# Patient Record
Sex: MALE | Race: WHITE | Employment: STUDENT | URBAN - METROPOLITAN AREA
[De-identification: names, ages, dates, MRNs, and addresses within clinical notes are randomized per-mention and may not be internally consistent; named-entity substitution may affect disease eponyms.]

---
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
## 2022-11-09 ENCOUNTER — OFFICE VISIT (OUTPATIENT)
Dept: FAMILY MEDICINE CLINIC | Age: 22
End: 2022-11-09
Payer: MEDICAID

## 2022-11-09 VITALS
TEMPERATURE: 97.9 F | SYSTOLIC BLOOD PRESSURE: 122 MMHG | BODY MASS INDEX: 26.48 KG/M2 | DIASTOLIC BLOOD PRESSURE: 76 MMHG | HEART RATE: 77 BPM | OXYGEN SATURATION: 96 % | HEIGHT: 70 IN | WEIGHT: 185 LBS

## 2022-11-09 DIAGNOSIS — H66.002 NON-RECURRENT ACUTE SUPPURATIVE OTITIS MEDIA OF LEFT EAR WITHOUT SPONTANEOUS RUPTURE OF TYMPANIC MEMBRANE: Primary | ICD-10-CM

## 2022-11-09 DIAGNOSIS — R09.81 NASAL CONGESTION: ICD-10-CM

## 2022-11-09 PROCEDURE — 99203 OFFICE O/P NEW LOW 30 MIN: CPT | Performed by: NURSE PRACTITIONER

## 2022-11-09 RX ORDER — FLUTICASONE PROPIONATE 50 MCG
1 SPRAY, SUSPENSION (ML) NASAL DAILY
Qty: 1 EACH | Refills: 0 | Status: SHIPPED | OUTPATIENT
Start: 2022-11-09

## 2022-11-09 RX ORDER — CEFDINIR 300 MG/1
300 CAPSULE ORAL 2 TIMES DAILY
Qty: 20 CAPSULE | Refills: 0 | Status: SHIPPED | OUTPATIENT
Start: 2022-11-09 | End: 2022-11-19

## 2022-11-09 RX ORDER — CETIRIZINE HYDROCHLORIDE 10 MG/1
10 TABLET ORAL DAILY
Qty: 14 TABLET | Refills: 0 | Status: SHIPPED | OUTPATIENT
Start: 2022-11-09

## 2022-11-09 RX ORDER — LEVETIRACETAM 1000 MG/1
TABLET ORAL
COMMUNITY
Start: 2022-08-31

## 2022-11-09 ASSESSMENT — ENCOUNTER SYMPTOMS
WHEEZING: 0
RHINORRHEA: 1
SORE THROAT: 0
SHORTNESS OF BREATH: 0
COUGH: 1

## 2022-11-09 NOTE — LETTER
[facility]  [address]  Phone: [phone]  Fax: [phone]    ROSARIO Mcghee - JEANETH        November 9, 2022     Patient: Judy Mak   YOB: 2000   Date of Visit: 11/9/2022       To Whom it May Concern:    Judy Mak was seen in my clinic on 11/9/2022. He may return to school on 11/10/2022. He was diagnosed with an upper respiratory infection and left ear infection. If you have any questions or concerns, please don't hesitate to call.     Sincerely,         ROSARIO Mcghee - CNP

## 2022-11-09 NOTE — PROGRESS NOTES
[address] [address]  Dept: [phone]  Dept Fax: [phone]    Monae Aguirre is a 23 y.o. male who presents to the urgent care today for his medical conditions/complaints as notedbelow. Monae Aguirre is c/o of Cough and Nasal Congestion (This has been going on since Sunday. )      HPI:     23 yr old male presents for cough and congestion for 3 days. Left ear pain. Negative home covid test, declines additional covid testing  Mom ill with similar uri sx and she also tested neg for covid at [facility]  autistic    Cough  This is a new problem. The current episode started in the past 7 days (x3d). The problem has been unchanged. The cough is Non-productive. Associated symptoms include a fever (?102)), headaches, myalgias, nasal congestion, postnasal drip and rhinorrhea. Pertinent negatives include no chest pain, chills, ear pain, rash, sore throat, shortness of breath or wheezing. Nothing aggravates the symptoms. He has tried OTC cough suppressant for the symptoms. The treatment provided no relief. There is no history of asthma, bronchitis, environmental allergies or pneumonia.      Past Medical History:   Diagnosis Date    Autism     Seizures ([address]     mom states this is his 4th seizure this year        Current Outpatient Medications   Medication Sig Dispense Refill    levETIRAcetam (KEPPRA) 1000 MG tablet take 1 tablet by mouth twice a day      sertraline (ZOLOFT) 50 MG tablet take 1 tablet by mouth once daily      cefdinir (OMNICEF) 300 MG capsule Take 1 capsule by mouth 2 times daily for 10 days 20 capsule 0    fluticasone (FLONASE ALLERGY RELIEF) 50 MCG/ACT nasal spray 1 spray by Nasal route daily 1 each 0    cetirizine (ZYRTEC ALLERGY) 10 MG tablet Take 1 tablet by mouth daily 14 tablet 0    OXcarbazepine (TRILEPTAL) 300 MG tablet Take 3 tablets by mouth 2 times daily 180 tablet 0 naproxen (NAPROSYN) 375 MG tablet Take 1 tablet by mouth 2 times daily (with meals) (Patient not taking: Reported on 11/9/2022) 20 tablet 0     No current facility-administered medications for this visit. No Known Allergies    Subjective:      Review of Systems   Constitutional:  Positive for fever (?102)). Negative for chills. HENT:  Positive for postnasal drip and rhinorrhea. Negative for ear pain and sore throat. Respiratory:  Positive for cough. Negative for shortness of breath and wheezing. Cardiovascular:  Negative for chest pain. Musculoskeletal:  Positive for myalgias. Skin:  Negative for rash. Allergic/Immunologic: Negative for environmental allergies. Neurological:  Positive for headaches. All other systems reviewed and are negative. 14 systems reviewed and negative except as listed in HPI. Objective:     Physical Exam  Vitals and nursing note reviewed. Constitutional:       General: He is not in acute distress. Appearance: Normal appearance. He is well-developed. He is not ill-appearing, toxic-appearing or diaphoretic. Comments: nontoxic   HENT:      Head: Normocephalic and atraumatic. Right Ear: Tympanic membrane, ear canal and external ear normal.      Left Ear: Ear canal and external ear normal.      Ears:      Comments: Left tm bulging and injected     Nose: Rhinorrhea present. Mouth/Throat:      Mouth: Mucous membranes are moist.      Pharynx: Oropharynx is clear. No oropharyngeal exudate or posterior oropharyngeal erythema. Eyes:      General: No scleral icterus. Right eye: No discharge. Left eye: No discharge. Conjunctiva/sclera: Conjunctivae normal.      Pupils: Pupils are equal, round, and reactive to light. Cardiovascular:      Rate and Rhythm: Normal rate and regular rhythm. Pulses: Normal pulses. Heart sounds: Normal heart sounds. Pulmonary:      Effort: Pulmonary effort is normal. No respiratory distress. Breath sounds: Normal breath sounds. No stridor. No wheezing, rhonchi or rales. Chest:      Chest wall: No tenderness. Abdominal:      General: Bowel sounds are normal. There is no distension. Palpations: Abdomen is soft. Tenderness: There is no abdominal tenderness. Musculoskeletal:         General: No tenderness or deformity. Normal range of motion. Cervical back: Normal range of motion and neck supple. Lymphadenopathy:      Cervical: No cervical adenopathy. Skin:     General: Skin is warm and dry. Findings: No rash ( no rash to visible skin). Neurological:      General: No focal deficit present. Mental Status: He is alert and oriented to person, place, and time. Motor: No abnormal muscle tone. Coordination: Coordination normal.   Psychiatric:         Mood and Affect: Mood normal.         Behavior: Behavior normal.     /76 (Site: Left Upper Arm, Position: Supine, Cuff Size: Medium Adult)   Pulse 77   Temp 97.9 °F (36.6 °C) (Temporal)   Ht 5' 10\" (1.778 m)   Wt 185 lb (83.9 kg)   SpO2 96%   BMI 26.54 kg/m²     Assessment:       Diagnosis Orders   1. Non-recurrent acute suppurative otitis media of left ear without spontaneous rupture of tympanic membrane        2. Nasal congestion            Plan:    Vss, ls clear t/o prone to om, left om on exam  Refilled claritin and flonase  Cefdinir rx  Mom declines additional coivd testing  I reviewed mom's chart from ER visit - neg covid and flu    Reviewed over-the-counter treatments for symptom management. Return if symptoms worsen or fail to improve, for make appt with Family Doc in 2 weeks for ear check.     Orders Placed This Encounter   Medications    cefdinir (OMNICEF) 300 MG capsule     Sig: Take 1 capsule by mouth 2 times daily for 10 days     Dispense:  20 capsule     Refill:  0    fluticasone (FLONASE ALLERGY RELIEF) 50 MCG/ACT nasal spray     Si spray by Nasal route daily     Dispense:  1 each Refill:  0    cetirizine (ZYRTEC ALLERGY) 10 MG tablet     Sig: Take 1 tablet by mouth daily     Dispense:  14 tablet     Refill:  0           Patient given educational materials - see patient instructions. Discussed use, benefit, and side effects of prescribed medications. All patient questions answered. Pt voicedunderstanding.     Electronically signed by ROSARIO Benton CNP on 11/9/2022 at 3:17 PM

## 2023-01-06 ENCOUNTER — HOSPITAL ENCOUNTER (EMERGENCY)
Age: 23
Discharge: HOME | End: 2023-01-06
Attending: EMERGENCY MEDICINE
Payer: MEDICAID

## 2023-01-06 ENCOUNTER — APPOINTMENT (OUTPATIENT)
Dept: GENERAL RADIOLOGY | Age: 23
End: 2023-01-06
Payer: MEDICAID

## 2023-01-06 ENCOUNTER — APPOINTMENT (OUTPATIENT)
Dept: CT IMAGING | Age: 23
End: 2023-01-06
Payer: MEDICAID

## 2023-01-06 VITALS
RESPIRATION RATE: 16 BRPM | HEART RATE: 90 BPM | WEIGHT: 200 LBS | SYSTOLIC BLOOD PRESSURE: 116 MMHG | DIASTOLIC BLOOD PRESSURE: 67 MMHG | HEIGHT: 70 IN | OXYGEN SATURATION: 95 % | TEMPERATURE: 99.7 F | BODY MASS INDEX: 28.63 KG/M2

## 2023-01-06 DIAGNOSIS — R56.9 SEIZURE (HCC): Primary | ICD-10-CM

## 2023-01-06 LAB
ABSOLUTE EOS #: 0.09 K/UL (ref 0–0.44)
ABSOLUTE IMMATURE GRANULOCYTE: 0.07 K/UL (ref 0–0.3)
ABSOLUTE LYMPH #: 1.59 K/UL (ref 1.2–5.2)
ABSOLUTE MONO #: 0.8 K/UL (ref 0.1–1.4)
ANION GAP SERPL CALCULATED.3IONS-SCNC: 7 MMOL/L (ref 9–17)
BASOPHILS # BLD: 0 % (ref 0–2)
BASOPHILS ABSOLUTE: 0.05 K/UL (ref 0–0.2)
BUN BLDV-MCNC: 8 MG/DL (ref 6–20)
CALCIUM SERPL-MCNC: 9.3 MG/DL (ref 8.6–10.4)
CHLORIDE BLD-SCNC: 101 MMOL/L (ref 98–107)
CO2: 29 MMOL/L (ref 20–31)
CREAT SERPL-MCNC: 0.78 MG/DL (ref 0.7–1.2)
EOSINOPHILS RELATIVE PERCENT: 1 % (ref 1–4)
GFR SERPL CREATININE-BSD FRML MDRD: >60 ML/MIN/1.73M2
GLUCOSE BLD-MCNC: 138 MG/DL (ref 70–99)
HCT VFR BLD CALC: 43.6 % (ref 40.7–50.3)
HEMOGLOBIN: 14.8 G/DL (ref 13–17)
IMMATURE GRANULOCYTES: 1 %
KEPPRA: <2 UG/ML
LYMPHOCYTES # BLD: 13 % (ref 25–45)
MCH RBC QN AUTO: 30.9 PG (ref 25.2–33.5)
MCHC RBC AUTO-ENTMCNC: 33.9 G/DL (ref 28.4–34.8)
MCV RBC AUTO: 91 FL (ref 82.6–102.9)
MONOCYTES # BLD: 7 % (ref 2–8)
NRBC AUTOMATED: 0 PER 100 WBC
PDW BLD-RTO: 12.8 % (ref 11.8–14.4)
PLATELET # BLD: 370 K/UL (ref 138–453)
PMV BLD AUTO: 9.8 FL (ref 8.1–13.5)
POTASSIUM SERPL-SCNC: 3.7 MMOL/L (ref 3.7–5.3)
RBC # BLD: 4.79 M/UL (ref 4.21–5.77)
SEG NEUTROPHILS: 78 % (ref 34–64)
SEGMENTED NEUTROPHILS ABSOLUTE COUNT: 9.52 K/UL (ref 1.8–8)
SODIUM BLD-SCNC: 137 MMOL/L (ref 135–144)
WBC # BLD: 12.1 K/UL (ref 4.5–13.5)

## 2023-01-06 PROCEDURE — 96365 THER/PROPH/DIAG IV INF INIT: CPT

## 2023-01-06 PROCEDURE — 80177 DRUG SCRN QUAN LEVETIRACETAM: CPT

## 2023-01-06 PROCEDURE — 93005 ELECTROCARDIOGRAM TRACING: CPT | Performed by: STUDENT IN AN ORGANIZED HEALTH CARE EDUCATION/TRAINING PROGRAM

## 2023-01-06 PROCEDURE — 12013 RPR F/E/E/N/L/M 2.6-5.0 CM: CPT

## 2023-01-06 PROCEDURE — 6370000000 HC RX 637 (ALT 250 FOR IP): Performed by: STUDENT IN AN ORGANIZED HEALTH CARE EDUCATION/TRAINING PROGRAM

## 2023-01-06 PROCEDURE — 85025 COMPLETE CBC W/AUTO DIFF WBC: CPT

## 2023-01-06 PROCEDURE — 80183 DRUG SCRN QUANT OXCARBAZEPIN: CPT

## 2023-01-06 PROCEDURE — 71045 X-RAY EXAM CHEST 1 VIEW: CPT

## 2023-01-06 PROCEDURE — 96375 TX/PRO/DX INJ NEW DRUG ADDON: CPT

## 2023-01-06 PROCEDURE — 2500000003 HC RX 250 WO HCPCS: Performed by: STUDENT IN AN ORGANIZED HEALTH CARE EDUCATION/TRAINING PROGRAM

## 2023-01-06 PROCEDURE — 80048 BASIC METABOLIC PNL TOTAL CA: CPT

## 2023-01-06 PROCEDURE — 70450 CT HEAD/BRAIN W/O DYE: CPT

## 2023-01-06 PROCEDURE — 99285 EMERGENCY DEPT VISIT HI MDM: CPT

## 2023-01-06 PROCEDURE — 6360000002 HC RX W HCPCS: Performed by: STUDENT IN AN ORGANIZED HEALTH CARE EDUCATION/TRAINING PROGRAM

## 2023-01-06 RX ORDER — ACETAMINOPHEN 500 MG
1000 TABLET ORAL ONCE
Status: COMPLETED | OUTPATIENT
Start: 2023-01-06 | End: 2023-01-06

## 2023-01-06 RX ORDER — LIDOCAINE HYDROCHLORIDE 10 MG/ML
20 INJECTION, SOLUTION INFILTRATION; PERINEURAL ONCE
Status: COMPLETED | OUTPATIENT
Start: 2023-01-06 | End: 2023-01-06

## 2023-01-06 RX ORDER — LEVETIRACETAM 15 MG/ML
1500 INJECTION INTRAVASCULAR ONCE
Status: COMPLETED | OUTPATIENT
Start: 2023-01-06 | End: 2023-01-06

## 2023-01-06 RX ORDER — MIDAZOLAM HYDROCHLORIDE 2 MG/2ML
2 INJECTION, SOLUTION INTRAMUSCULAR; INTRAVENOUS ONCE
Status: COMPLETED | OUTPATIENT
Start: 2023-01-06 | End: 2023-01-06

## 2023-01-06 RX ADMIN — ACETAMINOPHEN 1000 MG: 500 TABLET ORAL at 17:19

## 2023-01-06 RX ADMIN — LIDOCAINE HYDROCHLORIDE 3 ML: 10 INJECTION, SOLUTION INFILTRATION; PERINEURAL at 19:11

## 2023-01-06 RX ADMIN — LEVETIRACETAM 1500 MG: 15 INJECTION INTRAVENOUS at 18:40

## 2023-01-06 RX ADMIN — MIDAZOLAM 2 MG: 1 INJECTION INTRAMUSCULAR; INTRAVENOUS at 18:34

## 2023-01-06 ASSESSMENT — PAIN DESCRIPTION - LOCATION: LOCATION: HEAD

## 2023-01-06 ASSESSMENT — PAIN SCALES - GENERAL: PAINLEVEL_OUTOF10: 4

## 2023-01-06 ASSESSMENT — PAIN - FUNCTIONAL ASSESSMENT
PAIN_FUNCTIONAL_ASSESSMENT: 0-10
PAIN_FUNCTIONAL_ASSESSMENT: NONE - DENIES PAIN

## 2023-01-07 NOTE — ED PROVIDER NOTES
[address] ED  Emergency Department  Emergency Medicine Resident Sign-out     Care of Alejandro Gustafson was assumed from Dr. Srini Sal and is being seen for Seizures ([phone] lasted approx 1 min. ) and Laceration  . The patient's initial evaluation and plan have been discussed with the prior provider who initially evaluated the patient. EMERGENCY DEPARTMENT COURSE / MEDICAL DECISION MAKING:       MEDICATIONS GIVEN:  Orders Placed This Encounter   Medications    acetaminophen (TYLENOL) tablet 1,000 mg    levETIRAcetam (KEPPRA) 1500 mg/100 mL IVPB    lidocaine 1 % injection 20 mL    midazolam PF (VERSED) injection 2 mg       LABS / RADIOLOGY:     Labs Reviewed   CBC WITH AUTO DIFFERENTIAL - Abnormal; Notable for the following components:       Result Value    Seg Neutrophils 78 (*)     Lymphocytes 13 (*)     Immature Granulocytes 1 (*)     Segs Absolute 9.52 (*)     All other components within normal limits   BASIC METABOLIC PANEL - Abnormal; Notable for the following components:    Glucose 138 (*)     Anion Gap 7 (*)     All other components within normal limits   LEVETIRACETAM LEVEL   OXCARBAZEPINE LEVEL       XR CHEST PORTABLE    Result Date: 1/6/2023  EXAMINATION: ONE XRAY VIEW OF THE CHEST 1/6/2023 5:28 pm COMPARISON: 11/14/2016 HISTORY: Seizure. FINDINGS: Cardiomediastinal silhouette and pulmonary vasculature are normal.  No consolidation, pleural effusion, or pneumothorax. No acute abnormality. RECENT VITALS:     Temp: 99.7 °F (37.6 °C),  Heart Rate: (!) 114, Resp: 16, BP: 125/74, SpO2: 97 %    This patient is a 23 y.o. Male with breakthrough seizure and labs and imaging per previous resident reviewed. Plan for discharge pending negative CT head. ED Course as of 01/06/23 2315   Maura Downing Jan 06, 2023   [age]-year-old male, history of autism, MRDD had a seizure today. Followed by neurology at [address] [place] [address].   Apparently had some medication changes, increasing dose of Keppra, he is also on Trileptal.  Seizure was not witnessed however upon arrival of the mother patient appeared to be postictal.  Patient is not on any blood thinners. On physical exam patient has a laceration approximately 3 cm in length right above the left eyebrow. Patient is able to follow commands, no signs of trauma to head. No bruising/ecchymoses on the abdomen or back. No CT or L-spine midline tenderness. Lower extremities are nontender, no swelling. Will order basic labs, need to evaluate for breakthrough seizures despite therapeutic treatment. Unclear severity of head injury, neurologic exam obscured by patient's mental status. Although the mother states that this is baseline, will need to rule out intracranial hemorrhage. CT without contrast ordered. [SN]   4332 Chest x-ray reviewed by myself does not show any acute focal abnormalities. [KK]   1754 Levetiracetam: <2 [KK]   1754 Levetiracetam: <2 [OP]   7584 Keppra level came back at less than 2, will load with 1500 mg of Keppra IV. [KK]   1909 Patient was given 2 mg of Versed prior to laceration repair. See procedure note. Laceration repaired with 5-0 Prolene sutures x8. Patient tolerated procedure well. We will apply dressing, plan for discharge. Jose Ly Mother at bedside verbalized understanding that she needs to schedule an appointment with neurology over at [address] [place] [address] at earliest convenience. [KK]   2015 Assumed care, CT pending will follow up. Breakthrough seizures, loaded with keppra for subtherapeutic levels. 1000 mg BID. [MA]   2040 CT HEAD WO CONTRAST  IMPRESSION:  No acute intracranial abnormality. [MA]   2053 Patient to be discharged to follow-up with [address] [place] [address] neurology team.  Mother compliant understanding this plan. Strict return precautions provided. They expressed understanding of strict return precautions back to me.  [MA]      ED Course User Index  [address] Jimi Vásquez,   [MA] Jeb Hernández, DO           OUTSTANDING TASKS / RECOMMENDATIONS:    F/u CT head  Discharge if negative. FINAL IMPRESSION:     1.  Seizure [facility])        DISPOSITION:         DISPOSITION:  [x]  Discharge - home with follow up with neurology    []  Transfer -    []  Admission -     []  Against Medical Advice   []  Eloped   FOLLOW-UP: [facility]  [address] [facility] [address]  [phone]  Go to   As needed    [facility] [address]: Current Discharge Medication List              Praveen Osborne DO  Emergency Medicine Resident  [address] [facility]       Praveen Osborne [place]  Resident  01/06/23 4684

## 2023-01-07 NOTE — ED NOTES
Dr. Nestor Carrizales at [facility] to suture pt's lacerated wound. Writer gave versed 2mg prior to procedure. Pt had 6 sutures.         Lesa Pool RN  01/06/23 3615

## 2023-01-07 NOTE — ED PROVIDER NOTES
[facility] ED  Emergency Department Encounter  EmergencyMedicine Resident     Pt Diane Hayden  MRN: [id number]  [place] 2000  Date of evaluation: 1/6/23  PCP:  Monique Sun    This patient was evaluated in the Emergency Department for symptoms described in the history of present illness. The patient was evaluated in the context of the global COVID-19 pandemic, which necessitated consideration that the patient might be at risk for infection with the SARS-CoV-2 virus that causes COVID-19. Institutional protocols and algorithms that pertain to the evaluation of patients at risk for COVID-19 are in a state of rapid change based on information released by regulatory bodies including the CDC and federal and state organizations. These policies and algorithms were followed during the patient's care in the ED. CHIEF COMPLAINT       Chief Complaint   Patient presents with    Seizures     1535 lasted approx 1 min. Laceration       HISTORY OF PRESENT ILLNESS  (Location/Symptom, Timing/Onset, Context/Setting, Quality, Duration, Modifying Factors, Severity.)      Inocente Wolfe is a 23 y.o. male who presents with seizure. Patient has a history of autism, seizure disorder, takes Keppra daily, is followed by [facility]. Patient at bedside not in any acute distress, history is provided by the mother. The seizure was unwitnessed however the mother describes the aftermath as a postictal state. She states that this appears to be normal for a postictal state for him. Patient has not had breakthrough seizures as of late, however about a month ago he had some medication changes, Keppra was increased, he had a breakthrough seizure about a month ago, another breakthrough seizure today. Patient did lacerate his left upper eyebrow. PAST MEDICAL / SURGICAL / SOCIAL / FAMILY HISTORY      has a past medical history of Autism and Seizures ([facility] [address]        has no past surgical history on file.      Social History     Socioeconomic History    Marital status: Single     Spouse name: Not on file    Number of children: Not on file    Years of education: Not on file    Highest education level: Not on file   Occupational History    Not on file   Tobacco Use    Smoking status: Passive Smoke Exposure - Never Smoker    Smokeless tobacco: Never   Vaping Use    Vaping Use: Never used   Substance and Sexual Activity    Alcohol use: No    Drug use: No    Sexual activity: Not on file   Other Topics Concern    Not on file   Social History Narrative    Not on file     Social Determinants of Health     Financial Resource Strain: Not on file   Food Insecurity: Not on file   Transportation Needs: Not on file   Physical Activity: Not on file   Stress: Not on file   Social Connections: Not on file   Intimate Partner Violence: Not on file   Housing Stability: Not on file       History reviewed. No pertinent family history. Allergies:  Patient has no known allergies. Home Medications:  Prior to Admission medications    Medication Sig Start Date End Date Taking?  Authorizing Provider   levETIRAcetam (KEPPRA) 1000 MG tablet take 1 tablet by mouth twice a day 8/31/22   Historical Provider, MD   sertraline (ZOLOFT) 50 MG tablet take 1 tablet by mouth once daily 8/31/22   Historical Provider, MD   fluticasone [facility] ALLERGY RELIEF) 50 MCG/ACT nasal spray 1 spray by Nasal route daily  Patient not taking: Reported on 1/6/2023 11/9/22   ROSARIO Fernández CNP   cetirizine (ZYRTEC ALLERGY) 10 MG tablet Take 1 tablet by mouth daily  Patient not taking: Reported on 1/6/2023 11/9/22   ROSARIO Fernández CNP   OXcarbazepine (TRILEPTAL) 300 MG tablet Take 3 tablets by mouth 2 times daily 4/17/21   ROSARIO Duffy CNP     INITIAL VITALS:   /74   Pulse (!) 114   Temp 99.7 °F (37.6 °C) (Oral)   Resp 16   Ht 5' 10\" (1.778 m)   Wt 200 lb (90.7 kg)   SpO2 97%   BMI 28.70 kg/m²       DIFFERENTIAL DIAGNOSIS     PLAN (LABS / IMAGING / EKG):  Orders Placed This Encounter   Procedures    XR CHEST PORTABLE    CT HEAD WO CONTRAST    CBC with Auto Differential    Basic Metabolic Panel    OXCARBAZEPINE LEVEL    Levetiracetam Level    EKG 12 Lead       MEDICATIONS ORDERED:  Orders Placed This Encounter   Medications    acetaminophen (TYLENOL) tablet 1,000 mg    levETIRAcetam (KEPPRA) 1500 mg/100 mL IVPB    lidocaine 1 % injection 20 mL    midazolam PF (VERSED) injection 2 mg           DIAGNOSTIC RESULTS / EMERGENCY DEPARTMENT COURSE / MDM   LAB RESULTS:  Results for orders placed or performed during the hospital encounter of 01/06/23   CBC with Auto Differential   Result Value Ref Range    WBC 12.1 4.5 - 13.5 k/uL    RBC 4.79 4.21 - 5.77 m/uL    Hemoglobin 14.8 13.0 - 17.0 g/dL    Hematocrit 43.6 40.7 - 50.3 %    MCV 91.0 82.6 - 102.9 fL    MCH 30.9 25.2 - 33.5 pg    MCHC 33.9 28.4 - 34.8 g/dL    RDW 12.8 11.8 - 14.4 %    Platelets 701 894 - 168 k/uL    MPV 9.8 8.1 - 13.5 fL    NRBC Automated 0.0 0.0 per 100 WBC    Seg Neutrophils 78 (H) 34 - 64 %    Lymphocytes 13 (L) 25 - 45 %    Monocytes 7 2 - 8 %    Eosinophils % 1 1 - 4 %    Basophils 0 0 - 2 %    Immature Granulocytes 1 (H) 0 %    Segs Absolute 9.52 (H) 1.80 - 8.00 k/uL    Absolute Lymph # 1.59 1.20 - 5.20 k/uL    Absolute Mono # 0.80 0.10 - 1.40 k/uL    Absolute Eos # 0.09 0.00 - 0.44 k/uL    Basophils Absolute 0.05 0.00 - 0.20 k/uL    Absolute Immature Granulocyte 0.07 0.00 - 0.30 k/uL   Basic Metabolic Panel   Result Value Ref Range    Glucose 138 (H) 70 - 99 mg/dL    BUN 8 6 - 20 mg/dL    Creatinine 0.78 0.70 - 1.20 mg/dL    Est, Glom Filt Rate >60 >60 mL/min/1.73m2    Calcium 9.3 8.6 - 10.4 mg/dL    Sodium 137 135 - 144 mmol/L    Potassium 3.7 3.7 - 5.3 mmol/L    Chloride 101 98 - 107 mmol/L    CO2 29 20 - 31 mmol/L    Anion Gap 7 (L) 9 - 17 mmol/L   Levetiracetam Level   Result Value Ref Range    Levetiracetam Lvl <2 ug/mL         RADIOLOGY:  CT HEAD WO CONTRAST    Result Date: 1/6/2023  No acute intracranial abnormality. XR CHEST PORTABLE    Result Date: 1/6/2023  No acute abnormality. EKG  No acute ST elevations or depressions, sinus tachycardia normal QT C, normal axis. Tachycardia    EMERGENCY DEPARTMENT COURSE:  ED Course as of 01/07/23 1122   Fri Jan 06, 2023   166 68-year-old male, history of autism, MRDD had a seizure today. Followed by neurology at [address] [place] [address]. Apparently had some medication changes, increasing dose of Keppra, he is also on Trileptal.  Seizure was not witnessed however upon arrival of the mother patient appeared to be postictal.  Patient is not on any blood thinners. On physical exam patient has a laceration approximately 3 cm in length right above the left eyebrow. Patient is able to follow commands, no signs of trauma to head. No bruising/ecchymoses on the abdomen or back. No CT or L-spine midline tenderness. Lower extremities are nontender, no swelling. Will order basic labs, need to evaluate for breakthrough seizures despite therapeutic treatment. Unclear severity of head injury, neurologic exam obscured by patient's mental status. Although the mother states that this is baseline, will need to rule out intracranial hemorrhage. CT without contrast ordered. [ZY]   6521 Chest x-ray reviewed by myself does not show any acute focal abnormalities. [KK]   1754 Levetiracetam: <2 [KK]   1754 Levetiracetam: <2 [HH]   1048 Keppra level came back at less than 2, will load with 1500 mg of Keppra IV. [KK]   1909 Patient was given 2 mg of Versed prior to laceration repair. See procedure note. Laceration repaired with 5-0 Prolene sutures x8. Patient tolerated procedure well. We will apply dressing, plan for discharge. Kathy Left Mother at bedside verbalized understanding that she needs to schedule an appointment with neurology over at [address] [place] [address] at earliest convenience.  [KK]   2015 Assumed care, CT pending will follow up. Breakthrough seizures, loaded with keppra for subtherapeutic levels. 1000 mg BID. [MA]   2040 CT HEAD WO CONTRAST  IMPRESSION:  No acute intracranial abnormality. [MA]   2053 Patient to be discharged to follow-up with [facility] neurology team.  Mother compliant understanding this plan. Strict return precautions provided. They expressed understanding of strict return precautions back to me. [MA]      ED Course User Index  301 Jimi Vásquez DO  [MA] Nena Swanson DO         Assessment/Plan:     1. Seizure disorder   -Followed by Juan R Rodriugez, Current Keppra dose currently is likely subtherapeutic, loaded with Keppra in the ED today, mother verbalized understanding that she will require follow-up appointment with primary neurologist.     2.  Laceration   -Above left eyebrow, repaired, see procedure note. Laceration will require suture removal in about 7 to 10 days. Mother was given instructions on care, follow-up instructions. Patient signed out to Dr. Rosalba Wilson      1.  Seizure [facility])          DISPOSITION / PLAN     DISPOSITION  01/06/2023 07:19:31 PM      PATIENT REFERRED TO:  [facility]  [address] [facility] [address]  [phone]  Go to   As needed    Zana Carbo  [address] [place] [address]  [phone]          DISCHARGE MEDICATIONS:  Current Discharge Medication List          Ezequiel Bains DO  Emergency Medicine Resident    (Please note that portions of thisnote were completed with a voice recognition program.  Efforts were made to edit the dictations but occasionally words are mis-transcribed.)       Estuardo Luis DO  Resident  01/07/23 Galindo Misti

## 2023-01-07 NOTE — PROCEDURES
PROCEDURE NOTE - LACERATION CLOSURE    PATIENT NAME: Lisa Peck  MEDICAL RECORD NO. [id number]  DATE: 1/6/2023  ATTENDING PHYSICIAN: Daniel    PREOPERATIVE DIAGNOSIS: Laceration(s) as follows:  -Location: L eyebrow  -Length: 4.5 cm  -Layered closure: No    POSTOPERATIVE DIAGNOSIS:  Same  PROCEDURE PERFORMED:  Suture closure of laceration  PERFORMING PHYSICIAN: Mateo Phoenix DO  ANESTHESIA:  Local utilizing  Lidocaine 1% without epinephrine  ESTIMATED BLOOD LOSS:  Less than 25 ml. DISCUSSION:  Lisa Peck is a 23y.o.-year-old male. Patient requires laceration repair. The history and physical examination were reviewed and confirmed. CONSENT: The parent provided verbal consent for this procedure. PROCEDURE:  Prior to starting, the procedure and patient were confirmed by those present. The wound area was irrigated with sterile saline and draped in a sterile fashion. The wound area was anesthetized with Lidocaine 1% without epinephrine. The wound was explored with the following results No foreign bodies found. The wound was repaired with 5-0 Prolene using interrupted sutures. The wound was dressed with gauze. All sponge, instrument and needle counts were correct at the completion of the procedure. The patient tolerated the procedure well.      SUTURE COUNT:  Suture count: 8    COMPLICATIONS:  None     Mateo Phoenix DO  7:07 PM, 1/6/23

## 2023-01-07 NOTE — DISCHARGE INSTRUCTIONS
You have been seen in the ER today for seizures. Your Keppra level was not therapeutic, you will require follow up with your neurologist at [address] [place] [address] for medication adjustment. Sutures placed will need to be removed in approximately 7 to 10 days. Please follow-up with your primary care provider or return to the ED to have them removed. Please keep them dry, clean. You may rinse the wound with warm soapy water as needed. If you begin to experience any symptoms such as chest pain shortness of breath nausea vomiting dizziness drowsiness abdominal pain loss of consciousness or any other symptoms you find concerning please return to the ED for follow-up evaluation. If you have been given pain medication please take them only as prescribed for the your symptoms. Do not take more medication than recommended at any given time. Please follow-up with your primary care provider within 5 to 7 days for continued care, sooner if you have concerns.

## 2023-01-07 NOTE — ED PROVIDER NOTES
[facility]     Emergency Department     Faculty Attestation    I performed a history and physical examination of the patient and discussed management with the resident. I reviewed the residents note and agree with the documented findings including all diagnostic interpretations and plan of care. Any areas of disagreement are noted on the chart. I was personally present for the key portions of any procedures. I have documented in the chart those procedures where I was not present during the key portions. I have reviewed the emergency nurses triage note. I agree with the chief complaint, past medical history, past surgical history, allergies, medications, social and family history as documented unless otherwise noted below. Documentation of the HPI, Physical Exam and Medical Decision Making performed by karolyn is based on my personal performance of the HPI, PE and MDM. For Physician Assistant/ Nurse Practitioner cases/documentation I have personally evaluated this patient and have completed at least one if not all key elements of the E/M (history, physical exam, and MDM). Additional findings are as noted. Primary Care Physician: Frank Pulido    History: This is a 23 y.o. male who presents to the Emergency Department with complaint of seizure, head injury. Autistic, known history of seizures on Keppra and Trileptal    Physical:     height is 5' 10\" (1.778 m) and weight is 200 lb (90.7 kg). His oral temperature is 99.7 °F (37.6 °C). His blood pressure is 125/74 and his pulse is 114 (abnormal). His respiration is 16 and oxygen saturation is 97%.    23 y.o. male no acute distress answering questions at baseline according to mother. There is a 3 cm laceration of the left eyebrow. Moving all 4 extremities equally. No signs of trauma otherwise. Medical Decision Making  27-year-old male with known seizure history presenting after seizure.   Reportedly compliant with medications per mother. Check seizure levels, CT head given trauma to the head. Will require laceration repair    Amount and/or Complexity of Data Reviewed  Independent Historian: parent     Details: Limited history from patient due to autistic status  Labs: ordered. Decision-making details documented in ED Course. Details: Low Keppra level, loaded  Radiology: ordered. ECG/medicine tests: ordered. Risk  OTC drugs. Prescription drug management. Parenteral controlled substances. Minor surgery with no identified risk factors.         Vladislav Zelaya MD, Rayshawn Momin  Attending Emergency Physician        Kedar Martinez MD  01/06/23 2022

## 2023-01-07 NOTE — ED TRIAGE NOTES
Pt brought by mom to ED with c/o of seizure. Pt has history of seizure and is compliant with meds. Mom states that pt was found by her boyfriend on the floor still having seizure and bleeding from his forehead. Pt had a lacerated wound on his right eyebrow approximately 3 cm, bleeding controlled upon arrival to room. Pt has autism and has limited conversation as per mom. Pt is alert and answers to questions with yes or no.

## 2023-01-09 LAB
EKG ATRIAL RATE: 106 BPM
EKG P AXIS: 50 DEGREES
EKG P-R INTERVAL: 156 MS
EKG Q-T INTERVAL: 330 MS
EKG QRS DURATION: 90 MS
EKG QTC CALCULATION (BAZETT): 438 MS
EKG R AXIS: 48 DEGREES
EKG T AXIS: 32 DEGREES
EKG VENTRICULAR RATE: 106 BPM

## 2023-01-10 LAB — OXCARBAZEPINE: 21 UG/ML (ref 3–35)

## 2024-06-10 ENCOUNTER — APPOINTMENT (OUTPATIENT)
Dept: CT IMAGING | Age: 24
DRG: 101 | End: 2024-06-10
Payer: COMMERCIAL

## 2024-06-10 ENCOUNTER — APPOINTMENT (OUTPATIENT)
Dept: GENERAL RADIOLOGY | Age: 24
DRG: 101 | End: 2024-06-10
Payer: COMMERCIAL

## 2024-06-10 ENCOUNTER — HOSPITAL ENCOUNTER (INPATIENT)
Age: 24
LOS: 2 days | Discharge: HOME | DRG: 101 | End: 2024-06-12
Attending: EMERGENCY MEDICINE | Admitting: FAMILY MEDICINE
Payer: COMMERCIAL

## 2024-06-10 DIAGNOSIS — R56.9 SEIZURE (HCC): Primary | ICD-10-CM

## 2024-06-10 DIAGNOSIS — S43.004A DISLOCATION OF RIGHT SHOULDER JOINT, INITIAL ENCOUNTER: ICD-10-CM

## 2024-06-10 LAB
ANION GAP SERPL CALCULATED.3IONS-SCNC: 11 MMOL/L (ref 9–17)
BASOPHILS # BLD: 0.05 K/UL (ref 0–0.2)
BASOPHILS NFR BLD: 0 % (ref 0–2)
BUN SERPL-MCNC: 10 MG/DL (ref 6–20)
BUN/CREAT SERPL: 11 (ref 9–20)
CALCIUM SERPL-MCNC: 9.9 MG/DL (ref 8.6–10.4)
CHLORIDE SERPL-SCNC: 101 MMOL/L (ref 98–107)
CO2 SERPL-SCNC: 26 MMOL/L (ref 20–31)
CREAT SERPL-MCNC: 0.9 MG/DL (ref 0.7–1.2)
EOSINOPHIL # BLD: <0.03 K/UL (ref 0–0.44)
EOSINOPHILS RELATIVE PERCENT: 0 % (ref 1–4)
ERYTHROCYTE [DISTWIDTH] IN BLOOD BY AUTOMATED COUNT: 12.1 % (ref 11.8–14.4)
ERYTHROCYTE [SEDIMENTATION RATE] IN BLOOD BY PHOTOMETRIC METHOD: <1 MM/HR (ref 0–15)
GFR, ESTIMATED: >90 ML/MIN/1.73M2
GLUCOSE SERPL-MCNC: 109 MG/DL (ref 70–99)
HCT VFR BLD AUTO: 45.7 % (ref 40.7–50.3)
HGB BLD-MCNC: 15.4 G/DL (ref 13–17)
IMM GRANULOCYTES # BLD AUTO: 0.17 K/UL (ref 0–0.3)
IMM GRANULOCYTES NFR BLD: 1 %
LYMPHOCYTES NFR BLD: 0.94 K/UL (ref 1.1–3.7)
LYMPHOCYTES RELATIVE PERCENT: 5 % (ref 24–43)
MCH RBC QN AUTO: 30.3 PG (ref 25.2–33.5)
MCHC RBC AUTO-ENTMCNC: 33.7 G/DL (ref 28.4–34.8)
MCV RBC AUTO: 89.8 FL (ref 82.6–102.9)
MONOCYTES NFR BLD: 0.96 K/UL (ref 0.1–1.2)
MONOCYTES NFR BLD: 5 % (ref 3–12)
NEUTROPHILS NFR BLD: 89 % (ref 36–65)
NEUTS SEG NFR BLD: 18.38 K/UL (ref 1.5–8.1)
NRBC BLD-RTO: 0 PER 100 WBC
PLATELET # BLD AUTO: 319 K/UL (ref 138–453)
PMV BLD AUTO: 10.2 FL (ref 8.1–13.5)
POTASSIUM SERPL-SCNC: 4.2 MMOL/L (ref 3.7–5.3)
RBC # BLD AUTO: 5.09 M/UL (ref 4.21–5.77)
SODIUM SERPL-SCNC: 138 MMOL/L (ref 135–144)
WBC OTHER # BLD: 20.5 K/UL (ref 3.5–11.3)

## 2024-06-10 PROCEDURE — 94761 N-INVAS EAR/PLS OXIMETRY MLT: CPT

## 2024-06-10 PROCEDURE — 85652 RBC SED RATE AUTOMATED: CPT

## 2024-06-10 PROCEDURE — 99285 EMERGENCY DEPT VISIT HI MDM: CPT

## 2024-06-10 PROCEDURE — 80177 DRUG SCRN QUAN LEVETIRACETAM: CPT

## 2024-06-10 PROCEDURE — 96374 THER/PROPH/DIAG INJ IV PUSH: CPT

## 2024-06-10 PROCEDURE — 70450 CT HEAD/BRAIN W/O DYE: CPT

## 2024-06-10 PROCEDURE — 73020 X-RAY EXAM OF SHOULDER: CPT

## 2024-06-10 PROCEDURE — 2580000003 HC RX 258: Performed by: NURSE PRACTITIONER

## 2024-06-10 PROCEDURE — 80048 BASIC METABOLIC PNL TOTAL CA: CPT

## 2024-06-10 PROCEDURE — 80183 DRUG SCRN QUANT OXCARBAZEPIN: CPT

## 2024-06-10 PROCEDURE — 86140 C-REACTIVE PROTEIN: CPT

## 2024-06-10 PROCEDURE — 72125 CT NECK SPINE W/O DYE: CPT

## 2024-06-10 PROCEDURE — 85025 COMPLETE CBC W/AUTO DIFF WBC: CPT

## 2024-06-10 PROCEDURE — 2060000000 HC ICU INTERMEDIATE R&B

## 2024-06-10 PROCEDURE — 6360000002 HC RX W HCPCS: Performed by: NURSE PRACTITIONER

## 2024-06-10 PROCEDURE — 0RSJXZZ REPOSITION RIGHT SHOULDER JOINT, EXTERNAL APPROACH: ICD-10-PCS | Performed by: EMERGENCY MEDICINE

## 2024-06-10 PROCEDURE — 6370000000 HC RX 637 (ALT 250 FOR IP): Performed by: NURSE PRACTITIONER

## 2024-06-10 PROCEDURE — 71045 X-RAY EXAM CHEST 1 VIEW: CPT

## 2024-06-10 PROCEDURE — 23650 CLTX SHO DSLC W/MNPJ WO ANES: CPT

## 2024-06-10 PROCEDURE — 73030 X-RAY EXAM OF SHOULDER: CPT

## 2024-06-10 RX ORDER — OXCARBAZEPINE 300 MG/1
900 TABLET, FILM COATED ORAL ONCE
Status: COMPLETED | OUTPATIENT
Start: 2024-06-10 | End: 2024-06-10

## 2024-06-10 RX ORDER — LEVETIRACETAM 500 MG/1
1000 TABLET ORAL ONCE
Status: COMPLETED | OUTPATIENT
Start: 2024-06-10 | End: 2024-06-10

## 2024-06-10 RX ORDER — 0.9 % SODIUM CHLORIDE 0.9 %
1000 INTRAVENOUS SOLUTION INTRAVENOUS ONCE
Status: COMPLETED | OUTPATIENT
Start: 2024-06-10 | End: 2024-06-10

## 2024-06-10 RX ORDER — LEVETIRACETAM 500 MG/5ML
1000 INJECTION, SOLUTION, CONCENTRATE INTRAVENOUS ONCE
Status: COMPLETED | OUTPATIENT
Start: 2024-06-10 | End: 2024-06-11

## 2024-06-10 RX ORDER — HYDROCODONE BITARTRATE AND ACETAMINOPHEN 5; 325 MG/1; MG/1
1 TABLET ORAL ONCE
Status: COMPLETED | OUTPATIENT
Start: 2024-06-10 | End: 2024-06-10

## 2024-06-10 RX ORDER — LORAZEPAM 2 MG/ML
2 INJECTION INTRAMUSCULAR ONCE
Status: COMPLETED | OUTPATIENT
Start: 2024-06-10 | End: 2024-06-10

## 2024-06-10 RX ADMIN — LORAZEPAM 2 MG: 2 INJECTION, SOLUTION INTRAMUSCULAR; INTRAVENOUS at 23:15

## 2024-06-10 RX ADMIN — HYDROCODONE BITARTRATE AND ACETAMINOPHEN 1 TABLET: 5; 325 TABLET ORAL at 22:29

## 2024-06-10 RX ADMIN — SODIUM CHLORIDE 1000 ML: 9 INJECTION, SOLUTION INTRAVENOUS at 22:39

## 2024-06-10 RX ADMIN — LEVETIRACETAM 1000 MG: 500 TABLET, FILM COATED ORAL at 22:29

## 2024-06-10 RX ADMIN — OXCARBAZEPINE 900 MG: 300 TABLET, FILM COATED ORAL at 22:29

## 2024-06-10 ASSESSMENT — ENCOUNTER SYMPTOMS
SHORTNESS OF BREATH: 0
BACK PAIN: 0
COLOR CHANGE: 0
ABDOMINAL PAIN: 0

## 2024-06-10 ASSESSMENT — VISUAL ACUITY: OU: 1

## 2024-06-11 ENCOUNTER — APPOINTMENT (OUTPATIENT)
Dept: GENERAL RADIOLOGY | Age: 24
DRG: 101 | End: 2024-06-11
Payer: COMMERCIAL

## 2024-06-11 PROBLEM — S43.004A DISLOCATION OF RIGHT SHOULDER JOINT: Status: ACTIVE | Noted: 2024-06-11

## 2024-06-11 LAB
ANION GAP SERPL CALCULATED.3IONS-SCNC: 13 MMOL/L (ref 9–17)
BASOPHILS # BLD: 0.04 K/UL (ref 0–0.2)
BASOPHILS NFR BLD: 0 % (ref 0–2)
BUN SERPL-MCNC: 8 MG/DL (ref 6–20)
BUN/CREAT SERPL: 13 (ref 9–20)
CALCIUM SERPL-MCNC: 8.7 MG/DL (ref 8.6–10.4)
CHLORIDE SERPL-SCNC: 103 MMOL/L (ref 98–107)
CO2 SERPL-SCNC: 22 MMOL/L (ref 20–31)
CREAT SERPL-MCNC: 0.6 MG/DL (ref 0.7–1.2)
CRP SERPL HS-MCNC: <3 MG/L (ref 0–5)
EOSINOPHIL # BLD: <0.03 K/UL (ref 0–0.44)
EOSINOPHILS RELATIVE PERCENT: 0 % (ref 1–4)
ERYTHROCYTE [DISTWIDTH] IN BLOOD BY AUTOMATED COUNT: 12.2 % (ref 11.8–14.4)
GFR, ESTIMATED: >90 ML/MIN/1.73M2
GLUCOSE SERPL-MCNC: 109 MG/DL (ref 70–99)
HCT VFR BLD AUTO: 40.6 % (ref 40.7–50.3)
HGB BLD-MCNC: 13.8 G/DL (ref 13–17)
IMM GRANULOCYTES # BLD AUTO: 0.06 K/UL (ref 0–0.3)
IMM GRANULOCYTES NFR BLD: 0 %
LYMPHOCYTES NFR BLD: 1.5 K/UL (ref 1.1–3.7)
LYMPHOCYTES RELATIVE PERCENT: 11 % (ref 24–43)
MCH RBC QN AUTO: 30.6 PG (ref 25.2–33.5)
MCHC RBC AUTO-ENTMCNC: 34 G/DL (ref 28.4–34.8)
MCV RBC AUTO: 90 FL (ref 82.6–102.9)
MONOCYTES NFR BLD: 0.87 K/UL (ref 0.1–1.2)
MONOCYTES NFR BLD: 6 % (ref 3–12)
NEUTROPHILS NFR BLD: 83 % (ref 36–65)
NEUTS SEG NFR BLD: 11.25 K/UL (ref 1.5–8.1)
NRBC BLD-RTO: 0 PER 100 WBC
PLATELET # BLD AUTO: 278 K/UL (ref 138–453)
PMV BLD AUTO: 10 FL (ref 8.1–13.5)
POTASSIUM SERPL-SCNC: 3.9 MMOL/L (ref 3.7–5.3)
RBC # BLD AUTO: 4.51 M/UL (ref 4.21–5.77)
SODIUM SERPL-SCNC: 138 MMOL/L (ref 135–144)
WBC OTHER # BLD: 13.7 K/UL (ref 3.5–11.3)

## 2024-06-11 PROCEDURE — 97535 SELF CARE MNGMENT TRAINING: CPT

## 2024-06-11 PROCEDURE — 99223 1ST HOSP IP/OBS HIGH 75: CPT | Performed by: PSYCHIATRY & NEUROLOGY

## 2024-06-11 PROCEDURE — 97110 THERAPEUTIC EXERCISES: CPT

## 2024-06-11 PROCEDURE — 6370000000 HC RX 637 (ALT 250 FOR IP): Performed by: FAMILY MEDICINE

## 2024-06-11 PROCEDURE — 36415 COLL VENOUS BLD VENIPUNCTURE: CPT

## 2024-06-11 PROCEDURE — 6370000000 HC RX 637 (ALT 250 FOR IP): Performed by: PSYCHIATRY & NEUROLOGY

## 2024-06-11 PROCEDURE — 6360000002 HC RX W HCPCS: Performed by: NURSE PRACTITIONER

## 2024-06-11 PROCEDURE — 94761 N-INVAS EAR/PLS OXIMETRY MLT: CPT

## 2024-06-11 PROCEDURE — 97166 OT EVAL MOD COMPLEX 45 MIN: CPT

## 2024-06-11 PROCEDURE — 6360000002 HC RX W HCPCS: Performed by: PSYCHIATRY & NEUROLOGY

## 2024-06-11 PROCEDURE — 99222 1ST HOSP IP/OBS MODERATE 55: CPT | Performed by: INTERNAL MEDICINE

## 2024-06-11 PROCEDURE — 2580000003 HC RX 258: Performed by: NURSE PRACTITIONER

## 2024-06-11 PROCEDURE — 85025 COMPLETE CBC W/AUTO DIFF WBC: CPT

## 2024-06-11 PROCEDURE — 2060000000 HC ICU INTERMEDIATE R&B

## 2024-06-11 PROCEDURE — 6370000000 HC RX 637 (ALT 250 FOR IP): Performed by: NURSE PRACTITIONER

## 2024-06-11 PROCEDURE — 80048 BASIC METABOLIC PNL TOTAL CA: CPT

## 2024-06-11 PROCEDURE — 73020 X-RAY EXAM OF SHOULDER: CPT

## 2024-06-11 RX ORDER — SODIUM CHLORIDE 0.9 % (FLUSH) 0.9 %
10 SYRINGE (ML) INJECTION EVERY 12 HOURS SCHEDULED
Status: DISCONTINUED | OUTPATIENT
Start: 2024-06-11 | End: 2024-06-12 | Stop reason: HOSPADM

## 2024-06-11 RX ORDER — ACETAMINOPHEN 650 MG/1
650 SUPPOSITORY RECTAL EVERY 6 HOURS PRN
Status: DISCONTINUED | OUTPATIENT
Start: 2024-06-11 | End: 2024-06-12 | Stop reason: HOSPADM

## 2024-06-11 RX ORDER — SODIUM CHLORIDE 9 MG/ML
INJECTION, SOLUTION INTRAVENOUS PRN
Status: DISCONTINUED | OUTPATIENT
Start: 2024-06-11 | End: 2024-06-12 | Stop reason: HOSPADM

## 2024-06-11 RX ORDER — MAGNESIUM SULFATE IN WATER 40 MG/ML
2000 INJECTION, SOLUTION INTRAVENOUS PRN
Status: DISCONTINUED | OUTPATIENT
Start: 2024-06-11 | End: 2024-06-12 | Stop reason: HOSPADM

## 2024-06-11 RX ORDER — OXCARBAZEPINE 300 MG/1
900 TABLET, FILM COATED ORAL 2 TIMES DAILY
Status: DISCONTINUED | OUTPATIENT
Start: 2024-06-11 | End: 2024-06-12 | Stop reason: HOSPADM

## 2024-06-11 RX ORDER — ONDANSETRON 2 MG/ML
4 INJECTION INTRAMUSCULAR; INTRAVENOUS EVERY 6 HOURS PRN
Status: DISCONTINUED | OUTPATIENT
Start: 2024-06-11 | End: 2024-06-12 | Stop reason: HOSPADM

## 2024-06-11 RX ORDER — LORAZEPAM 2 MG/ML
4 INJECTION INTRAMUSCULAR EVERY 5 MIN PRN
Status: DISCONTINUED | OUTPATIENT
Start: 2024-06-11 | End: 2024-06-12 | Stop reason: HOSPADM

## 2024-06-11 RX ORDER — LEVETIRACETAM 500 MG/5ML
1500 INJECTION, SOLUTION, CONCENTRATE INTRAVENOUS ONCE
Status: COMPLETED | OUTPATIENT
Start: 2024-06-11 | End: 2024-06-11

## 2024-06-11 RX ORDER — TRAMADOL HYDROCHLORIDE 50 MG/1
50 TABLET ORAL EVERY 6 HOURS PRN
Status: DISCONTINUED | OUTPATIENT
Start: 2024-06-11 | End: 2024-06-12 | Stop reason: HOSPADM

## 2024-06-11 RX ORDER — SODIUM CHLORIDE 0.9 % (FLUSH) 0.9 %
10 SYRINGE (ML) INJECTION PRN
Status: DISCONTINUED | OUTPATIENT
Start: 2024-06-11 | End: 2024-06-12 | Stop reason: HOSPADM

## 2024-06-11 RX ORDER — ENOXAPARIN SODIUM 100 MG/ML
40 INJECTION SUBCUTANEOUS DAILY
Status: DISCONTINUED | OUTPATIENT
Start: 2024-06-11 | End: 2024-06-12 | Stop reason: HOSPADM

## 2024-06-11 RX ORDER — DIAZEPAM 10 MG/100UL
1 SPRAY NASAL PRN
COMMUNITY

## 2024-06-11 RX ORDER — ACETAMINOPHEN 325 MG/1
650 TABLET ORAL EVERY 6 HOURS PRN
Status: DISCONTINUED | OUTPATIENT
Start: 2024-06-11 | End: 2024-06-12 | Stop reason: HOSPADM

## 2024-06-11 RX ORDER — FAMOTIDINE 20 MG/1
20 TABLET, FILM COATED ORAL 2 TIMES DAILY
Status: DISCONTINUED | OUTPATIENT
Start: 2024-06-11 | End: 2024-06-12 | Stop reason: HOSPADM

## 2024-06-11 RX ORDER — ONDANSETRON 4 MG/1
4 TABLET, ORALLY DISINTEGRATING ORAL EVERY 8 HOURS PRN
Status: DISCONTINUED | OUTPATIENT
Start: 2024-06-11 | End: 2024-06-12 | Stop reason: HOSPADM

## 2024-06-11 RX ORDER — POTASSIUM CHLORIDE 7.45 MG/ML
10 INJECTION INTRAVENOUS PRN
Status: DISCONTINUED | OUTPATIENT
Start: 2024-06-11 | End: 2024-06-12 | Stop reason: HOSPADM

## 2024-06-11 RX ORDER — POTASSIUM CHLORIDE 20 MEQ/1
40 TABLET, EXTENDED RELEASE ORAL PRN
Status: DISCONTINUED | OUTPATIENT
Start: 2024-06-11 | End: 2024-06-12 | Stop reason: HOSPADM

## 2024-06-11 RX ADMIN — ENOXAPARIN SODIUM 40 MG: 100 INJECTION SUBCUTANEOUS at 11:07

## 2024-06-11 RX ADMIN — TRAMADOL HYDROCHLORIDE 50 MG: 50 TABLET ORAL at 13:55

## 2024-06-11 RX ADMIN — LEVETIRACETAM 1000 MG: 100 INJECTION, SOLUTION INTRAVENOUS at 00:26

## 2024-06-11 RX ADMIN — ACETAMINOPHEN 650 MG: 325 TABLET ORAL at 07:43

## 2024-06-11 RX ADMIN — LEVETIRACETAM 1500 MG: 100 INJECTION, SOLUTION INTRAVENOUS at 11:08

## 2024-06-11 RX ADMIN — SERTRALINE HYDROCHLORIDE 50 MG: 50 TABLET ORAL at 11:09

## 2024-06-11 RX ADMIN — PROPOFOL 81.6 MG: 10 INJECTION, EMULSION INTRAVENOUS at 00:13

## 2024-06-11 RX ADMIN — SODIUM CHLORIDE, PRESERVATIVE FREE 10 ML: 5 INJECTION INTRAVENOUS at 20:45

## 2024-06-11 RX ADMIN — ACETAMINOPHEN 650 MG: 325 TABLET ORAL at 19:36

## 2024-06-11 RX ADMIN — TRAMADOL HYDROCHLORIDE 50 MG: 50 TABLET ORAL at 19:36

## 2024-06-11 RX ADMIN — BRIVARACETAM 100 MG: 50 TABLET, FILM COATED ORAL at 20:40

## 2024-06-11 RX ADMIN — TRAMADOL HYDROCHLORIDE 50 MG: 50 TABLET ORAL at 07:43

## 2024-06-11 RX ADMIN — OXCARBAZEPINE 900 MG: 300 TABLET, FILM COATED ORAL at 20:40

## 2024-06-11 RX ADMIN — ACETAMINOPHEN 650 MG: 325 TABLET ORAL at 13:54

## 2024-06-11 RX ADMIN — FAMOTIDINE 20 MG: 20 TABLET, FILM COATED ORAL at 20:40

## 2024-06-11 RX ADMIN — SODIUM CHLORIDE, PRESERVATIVE FREE 10 ML: 5 INJECTION INTRAVENOUS at 07:49

## 2024-06-11 RX ADMIN — FAMOTIDINE 20 MG: 20 TABLET, FILM COATED ORAL at 11:09

## 2024-06-11 ASSESSMENT — PAIN DESCRIPTION - ORIENTATION
ORIENTATION: RIGHT

## 2024-06-11 ASSESSMENT — PAIN SCALES - GENERAL
PAINLEVEL_OUTOF10: 2
PAINLEVEL_OUTOF10: 2
PAINLEVEL_OUTOF10: 0

## 2024-06-11 ASSESSMENT — PAIN DESCRIPTION - FREQUENCY
FREQUENCY: OTHER (COMMENT)
FREQUENCY: OTHER (COMMENT)

## 2024-06-11 ASSESSMENT — PAIN SCALES - WONG BAKER
WONGBAKER_NUMERICALRESPONSE: NO HURT
WONGBAKER_NUMERICALRESPONSE: HURTS A LITTLE BIT

## 2024-06-11 ASSESSMENT — PAIN DESCRIPTION - ONSET
ONSET: AWAKENED FROM SLEEP
ONSET: AWAKENED FROM SLEEP

## 2024-06-11 ASSESSMENT — PAIN DESCRIPTION - LOCATION
LOCATION: ARM;SHOULDER

## 2024-06-11 ASSESSMENT — PAIN DESCRIPTION - PAIN TYPE
TYPE: ACUTE PAIN
TYPE: ACUTE PAIN

## 2024-06-11 ASSESSMENT — PAIN DESCRIPTION - DESCRIPTORS
DESCRIPTORS: PATIENT UNABLE TO DESCRIBE
DESCRIPTORS: OTHER (COMMENT)
DESCRIPTORS: PATIENT UNABLE TO DESCRIBE
DESCRIPTORS: PATIENT UNABLE TO DESCRIBE

## 2024-06-11 ASSESSMENT — PAIN - FUNCTIONAL ASSESSMENT
PAIN_FUNCTIONAL_ASSESSMENT: PREVENTS OR INTERFERES SOME ACTIVE ACTIVITIES AND ADLS
PAIN_FUNCTIONAL_ASSESSMENT: PREVENTS OR INTERFERES SOME ACTIVE ACTIVITIES AND ADLS

## 2024-06-11 NOTE — ED NOTES
Presents accompanied by mother with c/o seizure and right arm pain. Per pts mom pt had a seizure tonight, history of seizures. Pt takes Keppra and Trileptal at home for seizures. Per mom, no one was in the room when pt had a seizure tonight, but the stove was moved a couple feet so she thinks she hit his right arm on the stove.

## 2024-06-11 NOTE — ED NOTES
Neurologist, Dr. Duff requested the patient's mother be present when he rounds on the patient around 7:00 tomorrow morning.  I did discuss this with the patient's mother and she states she will be present.     Buck Rocha, ROSARIO - CNP  06/10/24 2334

## 2024-06-11 NOTE — CARE COORDINATION
Case Management Assessment  Initial Evaluation    Date/Time of Evaluation: 6/11/2024 4:00 PM  Assessment Completed by: IDLLON GREY RN    If patient is discharged prior to next notation, then this note serves as note for discharge by case management.    Patient Name: Dc Manuel                   YOB: 2000  Diagnosis: Seizure (HCC) [R56.9]  Dislocation of right shoulder joint, initial encounter [S43.004A]                   Date / Time: 6/10/2024  9:32 PM    Patient Admission Status: Inpatient   Readmission Risk (Low < 19, Mod (19-27), High > 27): Readmission Risk Score: 4.8    Current PCP: Rodri Anderson MD  PCP verified by CM? Yes    Chart Reviewed: Yes      History Provided by:    Patient Orientation: Alert and Oriented, Person, Self    Patient Cognition: Alert    Hospitalization in the last 30 days (Readmission):  No    If yes, Readmission Assessment in  Navigator will be completed.    Advance Directives:      Code Status: Full Code   Patient's Primary Decision Maker is: Legal Next of Kin      Discharge Planning:    Patient lives with: Parent Type of Home: House  Primary Care Giver: Family  Patient Support Systems include: Parent   Current Financial resources: None  Current community resources: None  Current services prior to admission: None            Current DME:              Type of Home Care services:  None    ADLS  Prior functional level: Assistance with the following:, Cooking, Housework, Shopping  Current functional level: Assistance with the following:, Cooking, Housework, Shopping    PT AM-PAC:   /24  OT AM-PAC:   /24    Family can provide assistance at DC: Yes  Would you like Case Management to discuss the discharge plan with any other family members/significant others, and if so, who? Yes (parent)  Plans to Return to Present Housing: Yes  Other Identified Issues/Barriers to RETURNING to current housing: medical condition  Potential Assistance needed at discharge: N/A

## 2024-06-11 NOTE — H&P
History & Physical  [facility]    Adult Hospitalist      Name: Dc Manuel  MRN: [id number]     Acct: [id number]  Room: [address] [place] [address]    Admit Date: 6/10/2024  9:32 PM  PCP: Rodri Anderson MD    Primary Problem  Principal Problem:    Seizure (HCC)  Active Problems:    Dislocation of right shoulder joint  Resolved Problems:    * No resolved hospital problems. *        Assesment/ plan:     Seizure disorder/epilepsy:  Patient is started on IV Bivaracetam  Neurology on consult    Right shoulder dislocation:  Patient shoulder is in sling  Consult orthopedics  Status post reduction in the emergency room    Leukocytosis:  Patient likely has leukocytosis secondary to seizures  Consult infectious disease    Depression with anxiety:  Continue sertraline      Continue to monitor/telemetry/CBC with differential daily/BMP daily  DVT and GI prophylaxis.  Continue medications as below      Scheduled Meds:   sodium chloride flush  10 mL IntraVENous 2 times per day    enoxaparin  40 mg SubCUTAneous Daily    famotidine  20 mg Oral BID    sertraline  50 mg Oral Daily    Brivaracetam  100 mg Oral BID     Continuous Infusions:   sodium chloride       PRN Meds:  sodium chloride flush, 10 mL, PRN  sodium chloride, , PRN  potassium chloride, 40 mEq, PRN   Or  potassium alternative oral replacement, 40 mEq, PRN   Or  potassium chloride, 10 mEq, PRN  magnesium sulfate, 2,000 mg, PRN  ondansetron, 4 mg, Q8H PRN   Or  ondansetron, 4 mg, Q6H PRN  magnesium hydroxide, 30 mL, Daily PRN  acetaminophen, 650 mg, Q6H PRN   Or  acetaminophen, 650 mg, Q6H PRN  LORazepam, 4 mg, Q5 Min PRN  traMADol, 50 mg, Q6H PRN        Chief Complaint:     Chief Complaint   Patient presents with    Arm Pain     Right shoulder/arm pain from seizure          History of Present Illness:      Dc Manuel is a 24 y.o.  male who presents with Arm Pain (Right shoulder/arm pain from seizure )  This is a 24-year-old gentleman has been admitted via emergency

## 2024-06-11 NOTE — ED NOTES
Pts mother states this seizure was unusual for him. States typically he stares off.  This time eyes were blinking and pt began drooling. Pt was rolled to his side. Mouth suctioned.   Pts mother states he typically has seizures every couple months.

## 2024-06-11 NOTE — ED PROVIDER NOTES
EMERGENCY DEPARTMENT ENCOUNTER    Pt Name: Dc Manuel  MRN: [id number]  Birthdate 2000  Date of evaluation: 6/11/24  CHIEF COMPLAINT       Chief Complaint   Patient presents with    Arm Pain     Right shoulder/arm pain from seizure      HISTORY OF PRESENT ILLNESS   HPI  24-year-old male with a history of autism, seizures on Trileptal and Keppra was brought to the ED by EMS for arm pain after seizure.  Mother states that he had a seizure earlier today, he is compliant with all of his medications.  After the seizure he began complaining of right arm pain so mother called to have him brought to the ED.  No recent illness, no drug or alcohol use, no significant trauma per mother.    REVIEW OF SYSTEMS     Review of Systems   Unable to perform ROS: Other     PASTMEDICAL HISTORY     Past Medical History:   Diagnosis Date    Autism     Seizures (HCC)     mom states this is his 4th seizure this year     SURGICAL HISTORY     History reviewed. No pertinent surgical history.  CURRENT MEDICATIONS       Current Discharge Medication List        CONTINUE these medications which have NOT CHANGED    Details   levETIRAcetam (KEPPRA) 1000 MG tablet take 1 tablet by mouth twice a day      sertraline (ZOLOFT) 50 MG tablet take 1 tablet by mouth once daily      fluticasone (FLONASE ALLERGY RELIEF) 50 MCG/ACT nasal spray 1 spray by Nasal route daily  Qty: 1 each, Refills: 0      cetirizine (ZYRTEC ALLERGY) 10 MG tablet Take 1 tablet by mouth daily  Qty: 14 tablet, Refills: 0      OXcarbazepine (TRILEPTAL) 300 MG tablet Take 3 tablets by mouth 2 times daily  Qty: 180 tablet, Refills: 0           ALLERGIES     has No Known Allergies.  FAMILY HISTORY     has no family status information on file.      SOCIAL HISTORY       Social History     Tobacco Use    Smoking status: Passive Smoke Exposure - Never Smoker    Smokeless tobacco: Never   Vaping Use    Vaping Use: Never used   Substance Use Topics    Alcohol use: No    Drug use: No 
closely.     Test considered, but not ordered: None    The patient was involved in his/her plan of care through shared decision making. The testing that was ordered was discussed with the patient. Any medications that may have been ordered were discussed with the patient.       I have reviewed the patient's previous medical records using the electronic health record that we have available.      Labs and imaging were reviewed.  Imaging was reviewed and reported by the radiologist. Results showed CT head and cervical spine negative for acute findings.  Shoulder x-ray shows Anterior glenohumeral joint dislocation.  No definite fracture.  Will attempt to reduce right shoulder with ER physician, Dr. Brunner. Patient was not postictal upon arrival and he was alert and moves all extremities to command.  Patient was initially given his home dose of Keppra and Trileptal.  While patient was in the ER he did have another seizure and was given Ativan.  Will load patient with additional IV Keppra.  Infectious workup in progress.  He is afebrile.  Neurology consult-I spoke with Dr. Duff who will evaluate the patient tomorrow.  Orders received.  Internal medicine consult-I spoke with Connie nurse practitioner with [facility] hospitalist group who accepts admission.  I did discuss hospital admission with the mother and she is agreeable.    CRITICAL CARE TIME     Due to the high probability of sudden and clinically significant deterioration in the patient's condition he required highest level of my preparedness to intervene urgently. I provided critical care time including documentation time, medication orders and management, reevaluation, vital sign assessment, ordering and reviewing of of lab tests ordering and reviewing of x-ray studies, and admission orders. Aggregate critical care time is between 30-35  minutes including only time during which I was engaged in work directly related to his care and did not include time

## 2024-06-12 VITALS
TEMPERATURE: 98.5 F | DIASTOLIC BLOOD PRESSURE: 88 MMHG | BODY MASS INDEX: 24.29 KG/M2 | SYSTOLIC BLOOD PRESSURE: 126 MMHG | HEART RATE: 68 BPM | WEIGHT: 173.5 LBS | RESPIRATION RATE: 16 BRPM | OXYGEN SATURATION: 94 % | HEIGHT: 71 IN

## 2024-06-12 PROBLEM — F84.0 AUTISM: Status: ACTIVE | Noted: 2024-06-12

## 2024-06-12 LAB
10OH-CARBAZEPINE SERPL-MCNC: 18 UG/ML (ref 3–35)
ANION GAP SERPL CALCULATED.3IONS-SCNC: 11 MMOL/L (ref 9–17)
BASOPHILS # BLD: 0.05 K/UL (ref 0–0.2)
BASOPHILS NFR BLD: 1 % (ref 0–2)
BILIRUB UR QL STRIP: NEGATIVE
BUN SERPL-MCNC: 10 MG/DL (ref 6–20)
BUN/CREAT SERPL: 14 (ref 9–20)
CALCIUM SERPL-MCNC: 8.5 MG/DL (ref 8.6–10.4)
CHLORIDE SERPL-SCNC: 105 MMOL/L (ref 98–107)
CLARITY UR: CLEAR
CO2 SERPL-SCNC: 24 MMOL/L (ref 20–31)
COLOR UR: YELLOW
CREAT SERPL-MCNC: 0.7 MG/DL (ref 0.7–1.2)
EOSINOPHIL # BLD: 0.15 K/UL (ref 0–0.44)
EOSINOPHILS RELATIVE PERCENT: 2 % (ref 1–4)
ERYTHROCYTE [DISTWIDTH] IN BLOOD BY AUTOMATED COUNT: 12.3 % (ref 11.8–14.4)
GFR, ESTIMATED: >90 ML/MIN/1.73M2
GLUCOSE SERPL-MCNC: 97 MG/DL (ref 70–99)
GLUCOSE UR STRIP-MCNC: NEGATIVE MG/DL
HCT VFR BLD AUTO: 40 % (ref 40.7–50.3)
HGB BLD-MCNC: 13.1 G/DL (ref 13–17)
HGB UR QL STRIP.AUTO: NEGATIVE
IMM GRANULOCYTES # BLD AUTO: 0.01 K/UL (ref 0–0.3)
IMM GRANULOCYTES NFR BLD: 0 %
KETONES UR STRIP-MCNC: NEGATIVE MG/DL
LEUKOCYTE ESTERASE UR QL STRIP: NEGATIVE
LYMPHOCYTES NFR BLD: 2.41 K/UL (ref 1.1–3.7)
LYMPHOCYTES RELATIVE PERCENT: 32 % (ref 24–43)
MAGNESIUM SERPL-MCNC: 2.1 MG/DL (ref 1.6–2.6)
MCH RBC QN AUTO: 30 PG (ref 25.2–33.5)
MCHC RBC AUTO-ENTMCNC: 32.8 G/DL (ref 28.4–34.8)
MCV RBC AUTO: 91.7 FL (ref 82.6–102.9)
MONOCYTES NFR BLD: 0.66 K/UL (ref 0.1–1.2)
MONOCYTES NFR BLD: 9 % (ref 3–12)
NEUTROPHILS NFR BLD: 57 % (ref 36–65)
NEUTS SEG NFR BLD: 4.33 K/UL (ref 1.5–8.1)
NITRITE UR QL STRIP: NEGATIVE
NRBC BLD-RTO: 0 PER 100 WBC
PH UR STRIP: 6 [PH] (ref 5–8)
PLATELET # BLD AUTO: 236 K/UL (ref 138–453)
PMV BLD AUTO: 10.4 FL (ref 8.1–13.5)
POTASSIUM SERPL-SCNC: 3.3 MMOL/L (ref 3.7–5.3)
PROT UR STRIP-MCNC: NEGATIVE MG/DL
RBC # BLD AUTO: 4.36 M/UL (ref 4.21–5.77)
SODIUM SERPL-SCNC: 140 MMOL/L (ref 135–144)
SP GR UR STRIP: 1.04 (ref 1–1.03)
UROBILINOGEN UR STRIP-ACNC: NORMAL EU/DL (ref 0–1)
WBC OTHER # BLD: 7.6 K/UL (ref 3.5–11.3)

## 2024-06-12 PROCEDURE — 36415 COLL VENOUS BLD VENIPUNCTURE: CPT

## 2024-06-12 PROCEDURE — 80048 BASIC METABOLIC PNL TOTAL CA: CPT

## 2024-06-12 PROCEDURE — 6370000000 HC RX 637 (ALT 250 FOR IP): Performed by: NURSE PRACTITIONER

## 2024-06-12 PROCEDURE — 6370000000 HC RX 637 (ALT 250 FOR IP): Performed by: PSYCHIATRY & NEUROLOGY

## 2024-06-12 PROCEDURE — 97535 SELF CARE MNGMENT TRAINING: CPT

## 2024-06-12 PROCEDURE — 81003 URINALYSIS AUTO W/O SCOPE: CPT

## 2024-06-12 PROCEDURE — 83735 ASSAY OF MAGNESIUM: CPT

## 2024-06-12 PROCEDURE — 2580000003 HC RX 258: Performed by: NURSE PRACTITIONER

## 2024-06-12 PROCEDURE — 97110 THERAPEUTIC EXERCISES: CPT

## 2024-06-12 PROCEDURE — 85025 COMPLETE CBC W/AUTO DIFF WBC: CPT

## 2024-06-12 PROCEDURE — 99232 SBSQ HOSP IP/OBS MODERATE 35: CPT | Performed by: PSYCHIATRY & NEUROLOGY

## 2024-06-12 PROCEDURE — 6370000000 HC RX 637 (ALT 250 FOR IP): Performed by: FAMILY MEDICINE

## 2024-06-12 RX ADMIN — BRIVARACETAM 100 MG: 50 TABLET, FILM COATED ORAL at 09:07

## 2024-06-12 RX ADMIN — OXCARBAZEPINE 900 MG: 300 TABLET, FILM COATED ORAL at 09:07

## 2024-06-12 RX ADMIN — POTASSIUM BICARBONATE 40 MEQ: 782 TABLET, EFFERVESCENT ORAL at 07:08

## 2024-06-12 RX ADMIN — SODIUM CHLORIDE, PRESERVATIVE FREE 10 ML: 5 INJECTION INTRAVENOUS at 08:00

## 2024-06-12 RX ADMIN — ACETAMINOPHEN 650 MG: 325 TABLET ORAL at 08:00

## 2024-06-12 RX ADMIN — SERTRALINE HYDROCHLORIDE 50 MG: 50 TABLET ORAL at 07:59

## 2024-06-12 RX ADMIN — TRAMADOL HYDROCHLORIDE 50 MG: 50 TABLET ORAL at 07:59

## 2024-06-12 RX ADMIN — POTASSIUM CHLORIDE 40 MEQ: 1500 TABLET, EXTENDED RELEASE ORAL at 07:59

## 2024-06-12 RX ADMIN — FAMOTIDINE 20 MG: 20 TABLET, FILM COATED ORAL at 08:00

## 2024-06-12 ASSESSMENT — PAIN DESCRIPTION - ORIENTATION: ORIENTATION: RIGHT

## 2024-06-12 ASSESSMENT — PAIN DESCRIPTION - DESCRIPTORS: DESCRIPTORS: OTHER (COMMENT)

## 2024-06-12 ASSESSMENT — PAIN DESCRIPTION - ONSET: ONSET: ON-GOING

## 2024-06-12 ASSESSMENT — PAIN DESCRIPTION - PAIN TYPE: TYPE: ACUTE PAIN

## 2024-06-12 ASSESSMENT — PAIN DESCRIPTION - FREQUENCY: FREQUENCY: OTHER (COMMENT)

## 2024-06-12 ASSESSMENT — PAIN SCALES - GENERAL
PAINLEVEL_OUTOF10: 0
PAINLEVEL_OUTOF10: 1

## 2024-06-12 ASSESSMENT — PAIN - FUNCTIONAL ASSESSMENT: PAIN_FUNCTIONAL_ASSESSMENT: PREVENTS OR INTERFERES SOME ACTIVE ACTIVITIES AND ADLS

## 2024-06-12 ASSESSMENT — PAIN DESCRIPTION - LOCATION: LOCATION: ARM;SHOULDER

## 2024-06-12 NOTE — PLAN OF CARE
Problem: Discharge Planning  Goal: Discharge to home or other facility with appropriate resources  6/11/2024 1758 by Mckenzie Mehta RN  Outcome: Progressing  Flowsheets (Taken 6/11/2024 0800)  Discharge to home or other facility with appropriate resources: Identify barriers to discharge with patient and caregiver  6/11/2024 0523 by Lori Carballo RN  Outcome: Progressing  Flowsheets (Taken 6/11/2024 0100)  Discharge to home or other facility with appropriate resources:   Identify barriers to discharge with patient and caregiver   Arrange for needed discharge resources and transportation as appropriate   Identify discharge learning needs (meds, wound care, etc)     Problem: Safety - Adult  Goal: Free from fall injury  6/11/2024 1758 by Mckenzie Mehta RN  Outcome: Progressing  Flowsheets (Taken 6/11/2024 1756)  Free From Fall Injury: Instruct family/caregiver on patient safety  6/11/2024 0523 by Lori Carballo RN  Outcome: Progressing     Problem: Pain  Goal: Verbalizes/displays adequate comfort level or baseline comfort level  6/11/2024 1758 by Mckenzie Mehta RN  Outcome: Progressing  Flowsheets  Taken 6/11/2024 1354  Verbalizes/displays adequate comfort level or baseline comfort level: Assess pain using appropriate pain scale  Taken 6/11/2024 0843  Verbalizes/displays adequate comfort level or baseline comfort level: Assess pain using appropriate pain scale  Taken 6/11/2024 0800  Verbalizes/displays adequate comfort level or baseline comfort level: Assess pain using appropriate pain scale  Taken 6/11/2024 0743  Verbalizes/displays adequate comfort level or baseline comfort level: Assess pain using appropriate pain scale  6/11/2024 0523 by Lori Carballo RN  Outcome: Progressing     Problem: Neurosensory - Adult  Goal: Achieves stable or improved neurological status  6/11/2024 1758 by Mckenzie Mehta RN  Outcome: Progressing  Flowsheets (Taken 6/11/2024 0800)  Achieves stable or improved 
  Problem: Discharge Planning  Goal: Discharge to home or other facility with appropriate resources  6/12/2024 0613 by Nacho George RN  Outcome: Progressing  6/11/2024 1758 by Mckenzie Mehta RN  Outcome: Progressing  Flowsheets (Taken 6/11/2024 0800)  Discharge to home or other facility with appropriate resources: Identify barriers to discharge with patient and caregiver     Problem: Safety - Adult  Goal: Free from fall injury  6/12/2024 0613 by Nacho George RN  Outcome: Progressing  6/11/2024 1758 by Mckenzie Mehta RN  Outcome: Progressing  Flowsheets (Taken 6/11/2024 1756)  Free From Fall Injury: Instruct family/caregiver on patient safety     Problem: Pain  Goal: Verbalizes/displays adequate comfort level or baseline comfort level  6/12/2024 0613 by Nacho George RN  Outcome: Progressing  6/11/2024 1758 by Mckenzie Mehta RN  Outcome: Progressing  Flowsheets  Taken 6/11/2024 1354  Verbalizes/displays adequate comfort level or baseline comfort level: Assess pain using appropriate pain scale  Taken 6/11/2024 0843  Verbalizes/displays adequate comfort level or baseline comfort level: Assess pain using appropriate pain scale  Taken 6/11/2024 0800  Verbalizes/displays adequate comfort level or baseline comfort level: Assess pain using appropriate pain scale  Taken 6/11/2024 0743  Verbalizes/displays adequate comfort level or baseline comfort level: Assess pain using appropriate pain scale     Problem: Neurosensory - Adult  Goal: Achieves stable or improved neurological status  6/12/2024 0613 by Nacho George RN  Outcome: Progressing  6/11/2024 1758 by Mckenzie Mehta RN  Outcome: Progressing  Flowsheets (Taken 6/11/2024 0800)  Achieves stable or improved neurological status: Assess for and report changes in neurological status  Goal: Absence of seizures  6/12/2024 0613 by Nacho George RN  Outcome: Progressing  6/11/2024 1758 by Mckenzie Mehta RN  Outcome: 
  Problem: Discharge Planning  Goal: Discharge to home or other facility with appropriate resources  Outcome: Progressing  Flowsheets (Taken 6/11/2024 0100)  Discharge to home or other facility with appropriate resources:   Identify barriers to discharge with patient and caregiver   Arrange for needed discharge resources and transportation as appropriate   Identify discharge learning needs (meds, wound care, etc)     Problem: Safety - Adult  Goal: Free from fall injury  Outcome: Progressing     Problem: Pain  Goal: Verbalizes/displays adequate comfort level or baseline comfort level  Outcome: Progressing     Problem: Neurosensory - Adult  Goal: Achieves stable or improved neurological status  Outcome: Progressing     Problem: Neurosensory - Adult  Goal: Absence of seizures  Outcome: Progressing     Problem: Neurosensory - Adult  Goal: Remains free of injury related to seizures activity  Outcome: Progressing     Problem: Neurosensory - Adult  Goal: Achieves maximal functionality and self care  Outcome: Progressing     Problem: Respiratory - Adult  Goal: Achieves optimal ventilation and oxygenation  Outcome: Progressing     Problem: Metabolic/Fluid and Electrolytes - Adult  Goal: Electrolytes maintained within normal limits  Outcome: Progressing     Problem: Anxiety  Goal: Will report anxiety at manageable levels  Description: INTERVENTIONS:  1. Administer medication as ordered  2. Teach and rehearse alternative coping skills  3. Provide emotional support with 1:1 interaction with staff  Outcome: Progressing     Problem: Coping  Goal: Pt/Family able to verbalize concerns and demonstrate effective coping strategies  Description: INTERVENTIONS:  1. Assist patient/family to identify coping skills, available support systems and cultural and spiritual values  2. Provide emotional support, including active listening and acknowledgement of concerns of patient and caregivers  3. Reduce environmental stimuli, as able  4. 
Nacho George, RN  Outcome: Progressing

## 2024-06-12 NOTE — CONSULTS
[facility]  [address], [address]  [place] [address]  Ph: [phone] or [phone]  FAX: [phone]    Chief Complaint: seizures    Interval history: Tolerating Briviact well.  Mother spoke to her neurologist.    HPI: I had the pleasure of seeing your patient today in neurology consultation for his symptoms. As you would recall Dc Manuel is a 24 y.o. male with autism spectrum disorder, syncope, seizures had breakthrough seizures    He follows up with Dr. Webb for his epilepsy and AEDs management.  His typical seizures include spacing out, becomes unresponsive to stimulus with vocalization and stiffening of both the arms, upward eye rolling, clenching of the fist and teeth with drooling of the mouth, sweating figure 4 sign with extension of the left arm and flexion of the right arm, eyes and head version to the left side, chronic body jerking, his face turns pale and grayish followed by choking sounds and generalized clonic jerking usually diet for at least 2 to 3 hours after the seizure.  He has been compliant on Keppra 1.5 g twice daily and oxcarbazepine 900 mg twice daily.  As per the mother he has been agitated and upset with behavioral abnormalities while on the combination of both of these drugs after Keppra was added to the regimen.  Prior records also mentions that he has had behavioral problems since the start of Keppra.    He had a seizure at home and another 2 seizures after coming to the ER.  He does not have any infections.    Dislocated right shoulder due to seizures injury    He lives with his mother and compliant on medications.    Past Medical History:   Diagnosis Date    Autism     Seizures (HCC)     mom states this is his 4th seizure this year     History reviewed. No pertinent surgical history.  No Known Allergies  History reviewed. No pertinent family history.   Social History     Socioeconomic History    Marital status: Single     Spouse name: 
Orthopedic Consult    Requesting Physician: Dr. Smith    CHIEF COMPLAINT: Right shoulder dislocation    HISTORY OF PRESENT ILLNESS:      The patient is a 24 y.o. male  who presents with [facility] ER admission following seizure and anterior right glenohumeral dislocation.  History is facilitated by family at bedside.  It is the first time event.  He is right-hand dominant.  ER evaluation included imaging of his right shoulder identifying an anterior dislocation without fracture.  He was treated with sedation, closed reduction, repeat radiographs showing anatomic alignment.  He has been placed in a shoulder immobilizer.  I am asked to see him orthopedically.  Denies ipsilateral elbow, wrist, hand symptoms.  Also no pain in right or left lower extremity left upper extremity, spine.    Past medical history includes autism.  No previous physical therapy, dislocations, fractures, rheumatoid arthritis, lupus, gout.    Past Medical History:    Past Medical History:   Diagnosis Date    Autism     Seizures (HCC)     mom states this is his 4th seizure this year       Past Surgical History:    History reviewed. No pertinent surgical history.    Medications Prior to Admission:   Current Facility-Administered Medications   Medication Dose Route Frequency Provider Last Rate Last Admin    sodium chloride flush 0.9 % injection 10 mL  10 mL IntraVENous 2 times per day Jose Manuel, Atria, APRN - CNP   10 mL at 06/11/24 2045    sodium chloride flush 0.9 % injection 10 mL  10 mL IntraVENous PRN Jose Manuel, Atria, APRN - CNP        0.9 % sodium chloride infusion   IntraVENous PRN Jose Manuel, Atria, APRN - CNP        potassium chloride (KLOR-CON M) extended release tablet 40 mEq  40 mEq Oral PRN Jose Manuel, Atria, APRN - CNP        Or    potassium bicarb-citric acid (EFFER-K) effervescent tablet 40 mEq  40 mEq Oral PRN Jose Manuel, Atria, APRN - CNP   40 mEq at 06/12/24 0708    Or    potassium chloride 10 mEq/100 mL IVPB (Peripheral Line)  10 mEq 
SHOULDER    6/10/2024 10:30 pm    COMPARISON:  None.    HISTORY:  ORDERING SYSTEM PROVIDED HISTORY: Post reduction  TECHNOLOGIST PROVIDED HISTORY:  Post reduction  Reason for Exam: Post reduction rt shoulder  Additional signs and symptoms: Post reduction rt shoulder    FINDINGS:  Anterior dislocation of the humerus head.  There is no evidence of fracture.  There is no evidence of osteolytic or osteoblastic lesions.  The soft tissues  are grossly unremarkable.  Impression: Anterior dislocation of the humerus head.     All of patient's labs were personally reviewed. All the imaging studies were personally reviewed and discussed with the patient.     Assessment: 24 y.o. male with autism spectrum disorder, syncope, seizures had breakthrough seizures, symmetrical exam.  Reactive leukocytosis, trending down.  ESR and CRP are normal.  Keppra level is 3    Recommendations:  -Follow oxcarbazepine level  -Continue oxcarbazepine 900 mg twice daily  -Switch Keppra 1.5 g twice daily to Briviact 100 mg twice daily as requested by the mother due to side effects.  Discussed with the mother about side effects.  She has reached out to her primary neurologist about these changes and will also let us know if the patient's neurologist agrees with the plan I would like to switch other medications  -Primary team management of right shoulder dislocation  -Seizure precautions discussed.  No driving for 6 months of the last seizure.  -Follow-up with neurologist within 2 to 4 weeks after discharge.    I would like to thank you for the consult. Please do not hesitate if you have any questions about the patient care.     75 minutes spent with greater than 50% of time spent face to face, reviewing images, labs, and other clinical notes from different sources, obtaining history, examining patient, discussing results with patient, counseling and coordinating care with other healthcare providers.    Guy Duff MD  [facility] 
medical condition->Emergency Medical Condition (MA) Reason for Exam: Seizure, fall. Hx of seizures Additional signs and symptoms: fall was unwitnessed per mother FINDINGS: BRAIN/VENTRICLES: There is no acute intracranial hemorrhage, mass effect or midline shift.  No abnormal extra-axial fluid collection.  The gray-white differentiation is maintained without evidence of an acute infarct.  There is no evidence of hydrocephalus. ORBITS: The visualized portion of the orbits demonstrate no acute abnormality. SINUSES: The visualized paranasal sinuses and mastoid air cells demonstrate no acute abnormality. SOFT TISSUES/SKULL:  No acute abnormality of the visualized skull or soft tissues. Cervical spine: No fracture or traumatic subluxation.  No significant degenerative changes.  No prevertebral soft tissue swelling.     No acute intracranial abnormality. No acute abnormality of the cervical spine.     XR SHOULDER RIGHT (MIN 2 VIEWS)    Result Date: 6/10/2024  EXAMINATION: TWO XRAY VIEWS OF THE RIGHT SHOULDER 6/10/2024 10:18 pm COMPARISON: None. HISTORY: ORDERING SYSTEM PROVIDED HISTORY: Shoulder pain after seizure today TECHNOLOGIST PROVIDED HISTORY: Shoulder pain after seizure today Reason for Exam: Rt shoulder pain after seizure, today FINDINGS: Bones: No acute fracture. No destructive osseous abnormality. Alignment: Anterior dislocation of the humeral head in relation to the glenoid. Joints: Normal. Soft tissues: Normal.     Anterior glenohumeral joint dislocation.  No definite fracture.       Cultures:   Culture and Sensitivities:  No results for input(s): \"SPECDESC\", \"SPECIAL\", \"CULTURE\", \"STATUS\", \"ORG\", \"CDIFFTOXPCR\", \"CAMPYLOBPCR\", \"SALMONELLAPC\", \"SHIGAPCR\", \"SHIGELLAPCR\" in the last 72 hours.      Electronically signed by Jose Alba MD on 6/11/2024 at 12:47 PM      [facility]  Jose Alba MD  [employer] messaging  OFFICE: [phone]    Thank you for allowing us

## 2024-06-12 NOTE — DISCHARGE SUMMARY
\"ALKPHOS\", \"BILITOT\", \"BILIDIR\", \"AMMONIA\", \"AMYLASE\", \"LIPASE\", \"LACTATE\", \"CHOL\", \"HDL\", \"CHOLHDLRATIO\", \"TRIG\", \"VLDL\", \"SNA34JJ\", \"PHENYTOIN\", \"PHENYF\", \"URICACID\", \"POCGLU\" in the last 72 hours.    Invalid input(s): \"LABGGT\", \"LDLCHOLESTEROL\"  No results found for: \"INR\", \"PROTIME\"  Lab Results   Component Value Date/Time    SPECIAL  2017 04:59 PM     Rapid strep negative Performed at [facility] [address], [place] [id number] [phone] 2017 04:59 PM     No results found for: \"CULTURE\"    No results found for: \"POCPH\", \"PHART\", \"PH\", \"POCPCO2\", \"DZP7KTU\", \"PCO2\", \"POCPO2\", \"PO2ART\", \"PO2\", \"POCHCO3\", \"VID4KNB\", \"HCO3\", \"NBEA\", \"PBEA\", \"BEART\", \"BE\", \"THGBART\", \"THB\", \"XLE3RFB\", \"BXTB3CAB\", \"Q0EGIERV\", \"O2SAT\", \"FIO2\"    Radiology:    XR SHOULDER RIGHT 1 VW    Result Date: 2024  And tonic alignment of the right glenohumeral joint space.     XR SHOULDER RIGHT 1 VW    Result Date: 2024  Anterior dislocation of the humerus head.     XR CHEST PORTABLE    Result Date: 6/10/2024  There is no evidence of acute chest disease.     CT HEAD WO CONTRAST    Result Date: 6/10/2024  No acute intracranial abnormality. No acute abnormality of the cervical spine.     CT CERVICAL SPINE WO CONTRAST    Result Date: 6/10/2024  No acute intracranial abnormality. No acute abnormality of the cervical spine.     XR SHOULDER RIGHT (MIN 2 VIEWS)    Result Date: 6/10/2024  Anterior glenohumeral joint dislocation.  No definite fracture.         All radiological studies reviewed      Reviews of Symptoms:    A 10 point system is reviewed and  negative except described in hospital course    Physical Exam:    Vitals:  /88   Pulse 68   Temp 98.5 °F (36.9 °C) (Oral)   Resp 16   Ht 1.803 m (5' 11\")   Wt 78.7 kg (173 lb 8 oz)   SpO2 94%   BMI 24.20 kg/m²   Temp (24hrs), Av.9 °F (36.6 °C), Min:97.5 °F (36.4 °C), Max:98.5 °F (36.9 °C)      General appearance - alert, well

## 2024-06-12 NOTE — PROGRESS NOTES
All personal belongings collected by mom, pt taken out to ER entrance in W/C, transferred to personal vehicle, tolerated well; sling on Rt arm appropriately.  
Dr. Duff(neuro) at bedside speaking with mom/son; new order obtained for Briviact 100mg BID.  
End Of Shift Note  [facility]  Summary of shift: Uneventful shift; pt participated with therapy; up to bathroom once with standby assist, urinated, unmeasured; maintained sling to Rt arm; Tramadol and Tylenol given PRN for pain, pt tolerated well; caregiver(parent) at bedside entire shift; no BM; A&O, unable to describe pain; Autistic.VS spot check Q4, patient disconnects telemetry and pulse oximeter.    Vitals:    Vitals:    06/11/24 1355 06/11/24 1652 06/11/24 1653 06/11/24 1654   BP:  113/63     Pulse:   84    Resp: 22 19    Temp:    97.9 °F (36.6 °C)   TempSrc:    Oral   SpO2:   96%    Weight:       Height:            I&O:   Intake/Output Summary (Last 24 hours) at 6/11/2024 1818  Last data filed at 6/11/2024 0507  Gross per 24 hour   Intake --   Output 600 ml   Net -600 ml       Resp Status: Clear; RA; No cough.    Ventilator Settings:     / / /     Critical Care IV infusions:   sodium chloride          LDA:   Peripheral IV 06/10/24 Left Antecubital (Active)   Number of days: 0          
End Of Shift Note  [facility]  Summary of shift: Uneventful. Resting in bed with mother at bedside. Patient received first dose of Briviact. No seizures activity. Unable to get UA. Tylenol + tramadol given at 1930 for R shoulder pain. Morning potassium of 3.3, replaced.    Vitals:    Vitals:    06/11/24 1654 06/11/24 2017 06/11/24 2339 06/12/24 0319   BP:  122/77 128/71 110/63   Pulse:  85 80 75   Resp:  16 15 15   Temp: 97.9 °F (36.6 °C) 97.5 °F (36.4 °C) 97.9 °F (36.6 °C) 97.9 °F (36.6 °C)   TempSrc: Oral Oral Oral Oral   SpO2:  95% 98% 95%   Weight:       Height:            I&O: No intake or output data in the 24 hours ending 06/12/24 0613    Resp Status: RA    Ventilator Settings:     / / /     Critical Care IV infusions:   sodium chloride          LDA:   Peripheral IV 06/11/24 Left;Posterior Hand (Active)   Number of days: 0          
Monitor placed on standby; pt resting in bed with eyes closed, mom at bedside.  
Occupational Therapy  Facility/Department: [facility]  Daily Treatment Note  NAME: Dc Manuel  : 2000  MRN: [id number]    Date of Service: 2024    RN Chely reports patient is medically stable for therapy treatment this date.  Chart reviewed prior to treatment and patient is agreeable for therapy.  All lines intact and patient positioned comfortably at end of treatment.  All patient needs addressed prior to ending therapy session.     Discharge Recommendations:  Home with assist PRN  OT Equipment Recommendations  Equipment Needed: No      Patient Diagnosis(es): The primary encounter diagnosis was Seizure (HCC). A diagnosis of Dislocation of right shoulder joint, initial encounter was also pertinent to this visit.     Assessment    Assessment: Pt tolerated session well, no c/o pain at R shoulder & pt's mother reporting he recently received available pain medication. Educated pt's mother and provided printed handouts on safe ADL completion/strategies to complete self care tasks to limit pain in R shoulder, w/ pt's mother receptive to education. Facilitated RUE AROM with pt this session, with very minimal shoulder exercises only completed within pt's pain tolerance. Pt's vital sign monitor on stand by throughout session & was OK to be off monitor per RN. Continued skilled OT services are indicated at this time to maximize this pt's safety/IND with self care and to facilitate safe discharge home.  Activity Tolerance: Patient tolerated treatment well  Discharge Recommendations: Home with assist PRN  Equipment Needed: No      Plan   Occupational Therapy Plan  Times Per Week: 1-2 additional visits  Specific Instructions for Next Treatment: dressing & bathing tech / one handed ADL tech as indicated; review sling use and RUE exercises  Current Treatment Recommendations: Strengthening;ROM;Balance training;Functional mobility training;Endurance training;Safety education & training;Pain 
Physical Therapy  DATE: 2024    NAME: Dc Manuel  MRN: [id number]   : 2000    Patient not seen this date for Physical Therapy due to:      [] Cancel by RN or physician due to:    [] Hemodialysis    [] Critical Lab Value Level     [] Blood transfusion in progress    [] Acute or unstable cardiovascular status   _MAP < 55 or more than >115  _HR < 40 or > 130    [] Acute or unstable pulmonary status   -FiO2 > 60%   _RR < 5 or >40    _O2 sats < 85%    [] Strict Bedrest    [] Off Unit for surgery or procedure    [] Off Unit for testing       [] Pending imaging to R/O fracture    [] Refusal by Patient      [] Other      [x] PT being discontinued at this time. No acute PT needs.  Writer spoke w/ OT staff this date who reports pt's mobility is at baseline.  Pt in sling d/t shoulder dislocation s/p seizure activity.  Writer will defer to OT for ADLs and family education.  Please re-order skilled PT if functional mobility status changes.       [] PT being discontinued at this time as the patient has been transferred to hospice care. No further needs.      Tonie Rao, PT      
Pt arrived to room with mother at side. Pt placed on monitor, seizure precautions initiated, VS stable, pt asleep and in postictal state from seizure at 2314 pm.   
Pt continues to remove telemetry and pulse oximeter; Dr. Smith was messaged and new order given to remove telemetry, spot check Q4 hrs.  
Pt mom in room and said pt having a \"lot of pain\"; pt admitted to having pain, unable to describe or give number; Dr. Smith messaged regarding something stronger than Tylenol; new order given for Tramadol 50mg PO Q6 PRN.  
SPIRITUAL CARE DEPARTMENT - [facility]  PROGRESS NOTE    Room # 1108/1108-01   Name: Dc Manuel               Reason for visit: Routine    I visited the family.    Admit Date & Time: 6/10/2024  9:32 PM    Assessment:  Dc Manuel is a 24 y.o. male in the hospital because \"seizure\". Upon entering the room pt was lying in bed with eyes closed. Pt's mother was seated bedside. Pt's doctor entered room along with nurses and attended to pt. Doctor and nurses exited room shortly thereafter. Pt's mother shared about pt's ongoing medical and life challenges. Pt's mother also shared about her own challenging circumstances and \"stress\" Pt's mother further shared about her spiritual beliefs and ariel in God. Pt's mother welcomed this writer to pray at bedside.        Intervention:  I introduced myself and my title as  I offered space for pt's mother  to express feelings, needs, and concerns and provided a ministry presence. Writer actively listened to pt's mother and provided emotional support.     Outcome:  Pt's mother thanked writer for visit     Plan:  Chaplains will remain available to offer spiritual and emotional support as needed.    Electronically signed by Chaplain Daniela, on 6/11/2024 at 10:32 AM.  Spiritual Care Department  [facility]        06/11/24 1031   Encounter Summary   Encounter Overview/Reason Spiritual/Emotional Needs   Service Provided For Family   Referral/Consult From [facility]   Support System Significant other   Last Encounter  06/11/24   Complexity of Encounter Moderate   Begin Time 1010   End Time  1025   Total Time Calculated 15 min   Spiritual/Emotional needs   Type Spiritual Support   Assessment/Intervention/Outcome   Assessment Anxious;Coping   Intervention Active listening;Sustaining Presence/Ministry of presence;Life review/Legacy;Nurtured Hope;Prayer (assurance of)/[place];Discussed relationship with God;Explored/Affirmed feelings, thoughts, concerns   Outcome 
Transitions of Care Pharmacy Service   Medication Review    The patient's list of current home medications has been reviewed.     Source(s) of information: Patient's mother, Adam (patient receives tablet therapy packs from [facility]).    Based on information provided by the above source(s), I have updated the patient's home med list as described below.     I changed or updated the following medications on the patient's home medication list:  Removed [DISCONTINUED] fluticasone (FLONASE ALLERGY RELIEF) 50 MCG/ACT nasal spray, 1 spray by Nasal route daily (Patient not taking: Reported on 1/6/2023)  [DISCONTINUED] cetirizine (ZYRTEC ALLERGY) 10 MG tablet, Take 1 tablet by mouth daily (Patient not taking: Reported on 1/6/2023)   Added diazePAM (VALTOCO 10 MG DOSE) 10 MG/0.1ML LIQD, 1 spray by Each Nostril route as needed (for seizures lasting more than 2 minutes.)   Adjusted   levETIRAcetam (KEPPRA) 500 MG tablet, Take 3 tablets by mouth 2 times daily     PROVIDER ACTION REQUESTED  Medications that need to be addressed by a physician/nurse practitioner: N/A.    Please feel free to call me with any questions about this encounter. Thank you.    Antonietta Crews RPH   Transitions of Care Pharmacy Service  Phone:  [phone]    Electronically signed by Antonietta Crews RPH on 6/11/2024 at 11:12 AM     Medications Prior to Admission:   diazePAM (VALTOCO 10 MG DOSE) 10 MG/0.1ML LIQD, 1 spray by Each Nostril route as needed (for seizures lasting more than 2 minutes.)  levETIRAcetam (KEPPRA) 500 MG tablet, Take 3 tablets by mouth 2 times daily  sertraline (ZOLOFT) 50 MG tablet, take 1 tablet by mouth once daily  OXcarbazepine (TRILEPTAL) 300 MG tablet, Take 3 tablets by mouth 2 times daily     
Stairs to enter with rails  Entrance Stairs - Number of Steps: 3  Has the patient had two or more falls in the past year or any fall with injury in the past year?: Yes (1 fall PTA; pt had a seizure and dislocated R shoulder; pt's mother reporting all of pt's falls are related to seizures)  Receives Help From: Family  ADL Assistance: Independent  Homemaking Assistance: Needs assistance  Homemaking Responsibilities: No  Ambulation Assistance: Independent  Transfer Assistance: Independent  Active : No       Objective           Observation/Palpation  Posture: Good  Observation: Asleep in bed upon entry to room, vitals monitor on stand by & RN stated it was ok for pt to be off telemetry; fully dressed; RUE in sling & positioned appropriately once pt at EOB  Safety Devices  Type of Devices: All fall risk precautions in place;Bed alarm in place;Call light within reach;Left in bed;Heels elevated for pressure relief;Nurse notified;Gait belt (Mother present in room throughout session & upon writer exit)  Restraints  Restraints Initially in Place: No    Bed Mobility Training  Bed Mobility Training: Yes  Overall Level of Assistance: Minimum assistance;Assist X1 (Moira to sit up to EOB; pt reaching for therapist hand for UB assist. Pt able to transfer back to supine & scoot/position self in bed without difficulty at session end.)  Interventions: Safety awareness training;Verbal cues  Rolling: Stand-by assistance  Supine to Sit: Minimum assistance;Assist X1  Sit to Supine: Stand-by assistance  Scooting: Stand-by assistance    Balance  Sitting: High guard (CGA/SBA to sit at EOB d/t pt's impulsivity at times)  Standing: High guard (CGA with no device)    Transfer Training  Transfer Training: Yes  Overall Level of Assistance: Stand-by assistance  Interventions: Safety awareness training;Verbal cues  Sit to Stand: Stand-by assistance  Stand to Sit: Stand-by assistance       AROM: Within functional limits (LUE WFLs; R shoulder

## 2024-07-01 ENCOUNTER — TELEPHONE (OUTPATIENT)
Dept: NEUROLOGY | Age: 24
End: 2024-07-01

## 2024-07-01 NOTE — TELEPHONE ENCOUNTER
07 01 2024 called patient times 2 (06 17 2024 and 06 24 2024 at )  to schedule hospital follow up appointment with Dr. Duff, left message on machine both times, no response.  I mailed the patient a letter asking them to call the office back to schedule this appointment.  KS